# Patient Record
Sex: MALE | Race: WHITE | NOT HISPANIC OR LATINO | Employment: OTHER | URBAN - METROPOLITAN AREA
[De-identification: names, ages, dates, MRNs, and addresses within clinical notes are randomized per-mention and may not be internally consistent; named-entity substitution may affect disease eponyms.]

---

## 2017-08-08 ENCOUNTER — TELEPHONE (OUTPATIENT)
Dept: PRIMARY CARE CLINIC | Facility: CLINIC | Age: 82
End: 2017-08-08

## 2017-08-08 NOTE — TELEPHONE ENCOUNTER
Attempted to speak with patient, called requesting the last ov with Dr. Patel. That office date 07/19/2017 Hospital follow up.

## 2017-08-08 NOTE — TELEPHONE ENCOUNTER
----- Message from Shelby Fischer sent at 8/8/2017  9:19 AM CDT -----  Contact: [facility]  Wants date of last visit with Dr Langston   Call back

## 2017-08-10 ENCOUNTER — TELEPHONE (OUTPATIENT)
Dept: PRIMARY CARE CLINIC | Facility: CLINIC | Age: 82
End: 2017-08-10

## 2017-08-10 NOTE — TELEPHONE ENCOUNTER
----- Message from Tenisha Lombardo sent at 8/10/2017 10:13 AM CDT -----  Contact: Duyen with [facility] phone [phone]  Duyen with [facility] phone [phone], Second time calling needs to find out when was the last time Dr. Langston saw this patient. And also needs to know if Dr. Langston will continue to follow this patient. Please advise. Thanks.

## 2017-08-10 NOTE — TELEPHONE ENCOUNTER
Patient was seen on 7/19/17 for hospital follow up.. [facility] is sending orders over that need to be signed by PCP as hospitalist only sign for one time

## 2017-08-14 RX ORDER — CARVEDILOL 3.12 MG/1
3.12 TABLET ORAL 2 TIMES DAILY WITH MEALS
Qty: 60 TABLET | Refills: 11 | Status: ON HOLD | OUTPATIENT
Start: 2017-08-14 | End: 2018-08-02 | Stop reason: HOSPADM

## 2017-08-15 RX ORDER — LOVASTATIN 20 MG/1
20 TABLET ORAL NIGHTLY
Qty: 90 TABLET | Refills: 3 | Status: CANCELLED | OUTPATIENT
Start: 2017-08-15 | End: 2018-08-15

## 2017-08-15 RX ORDER — LOVASTATIN 20 MG/1
20 TABLET ORAL NIGHTLY
Qty: 90 TABLET | Refills: 3 | Status: SHIPPED | OUTPATIENT
Start: 2017-08-15 | End: 2018-12-21 | Stop reason: SDUPTHER

## 2017-08-31 ENCOUNTER — TELEPHONE (OUTPATIENT)
Dept: PRIMARY CARE CLINIC | Facility: CLINIC | Age: 82
End: 2017-08-31

## 2017-08-31 NOTE — TELEPHONE ENCOUNTER
----- Message from Carisa Pearson sent at 8/31/2017  4:39 PM CDT -----  Contact: [facility]  Call connected to pod. Received Critical lab results and CBC was called into Dr, needs to know if patient was called, in the hospital or if there are other orders.

## 2017-08-31 NOTE — TELEPHONE ENCOUNTER
Advised patient niece was taking patient to ED per Dr Langston.. Called and confirmed patient was currently in ED

## 2017-09-06 RX ORDER — FINASTERIDE 5 MG/1
5 TABLET, FILM COATED ORAL DAILY
Qty: 30 TABLET | Refills: 11 | Status: SHIPPED | OUTPATIENT
Start: 2017-09-06 | End: 2018-09-25 | Stop reason: SDUPTHER

## 2017-09-08 ENCOUNTER — TELEPHONE (OUTPATIENT)
Dept: PRIMARY CARE CLINIC | Facility: CLINIC | Age: 82
End: 2017-09-08

## 2017-09-08 NOTE — TELEPHONE ENCOUNTER
----- Message from Peter May sent at 9/8/2017  9:09 AM CDT -----  Contact: [facility] nurse- Yaquelin [phone]  The nurse want to know if the doctor will continue taking insurance [employer].Thanks!

## 2017-09-14 ENCOUNTER — TELEPHONE (OUTPATIENT)
Dept: PRIMARY CARE CLINIC | Facility: CLINIC | Age: 82
End: 2017-09-14

## 2017-09-14 NOTE — TELEPHONE ENCOUNTER
----- Message from Shelby Fischer sent at 9/14/2017  8:05 AM CDT -----  Contact: antonijosesito flores   Wants labs ordered thru home health  Call back

## 2017-09-15 ENCOUNTER — TELEPHONE (OUTPATIENT)
Dept: PRIMARY CARE CLINIC | Facility: CLINIC | Age: 82
End: 2017-09-15

## 2017-09-15 DIAGNOSIS — D64.9 ANEMIA, UNSPECIFIED TYPE: Primary | ICD-10-CM

## 2017-09-15 RX ORDER — HYDROCHLOROTHIAZIDE 25 MG/1
25 TABLET ORAL DAILY
Qty: 30 TABLET | Refills: 11 | Status: SHIPPED | OUTPATIENT
Start: 2017-09-15 | End: 2018-01-24

## 2017-09-15 NOTE — TELEPHONE ENCOUNTER
----- Message from Tenisha Álvarez sent at 9/15/2017 10:35 AM CDT -----  Pt has home health ... Asking to give orders for labs to his home health nurse / hard to bring him in... Call Candelaria Bonner [phone]

## 2017-10-04 RX ORDER — CLOPIDOGREL BISULFATE 75 MG/1
75 TABLET ORAL DAILY
Qty: 30 TABLET | Refills: 11 | Status: SHIPPED | OUTPATIENT
Start: 2017-10-04 | End: 2017-11-17

## 2017-10-04 RX ORDER — CLOPIDOGREL BISULFATE 75 MG/1
TABLET ORAL
COMMUNITY
Start: 2017-08-16 | End: 2017-10-04 | Stop reason: SDUPTHER

## 2017-11-03 RX ORDER — GABAPENTIN 300 MG/1
300 CAPSULE ORAL 3 TIMES DAILY
Qty: 90 CAPSULE | Refills: 3 | Status: ON HOLD | OUTPATIENT
Start: 2017-11-03 | End: 2018-03-19 | Stop reason: SDUPTHER

## 2017-11-03 RX ORDER — PANTOPRAZOLE SODIUM 40 MG/1
40 TABLET, DELAYED RELEASE ORAL DAILY
Qty: 90 TABLET | Refills: 3 | Status: SHIPPED | OUTPATIENT
Start: 2017-11-03 | End: 2018-11-14 | Stop reason: SDUPTHER

## 2017-11-03 RX ORDER — PANTOPRAZOLE SODIUM 40 MG/1
TABLET, DELAYED RELEASE ORAL
COMMUNITY
Start: 2017-08-15 | End: 2017-11-03 | Stop reason: SDUPTHER

## 2017-11-03 NOTE — TELEPHONE ENCOUNTER
----- Message from Peter May sent at 11/3/2017 10:09 AM CDT -----  Contact: Nevicente Lewis [place] [phone]  Patient needs a refill on rx gabapentin, rx pantoprazole with [facility] Patient had labs drawn by home health nurse, patient's niece called asking for lab results.. Thanks!

## 2017-11-17 ENCOUNTER — OFFICE VISIT (OUTPATIENT)
Dept: HEMATOLOGY/ONCOLOGY | Facility: CLINIC | Age: 82
End: 2017-11-17
Payer: COMMERCIAL

## 2017-11-17 VITALS
SYSTOLIC BLOOD PRESSURE: 146 MMHG | WEIGHT: 155.13 LBS | HEIGHT: 65 IN | RESPIRATION RATE: 18 BRPM | DIASTOLIC BLOOD PRESSURE: 56 MMHG | HEART RATE: 63 BPM | TEMPERATURE: 98 F | BODY MASS INDEX: 25.85 KG/M2

## 2017-11-17 DIAGNOSIS — K92.2 GASTROINTESTINAL HEMORRHAGE, UNSPECIFIED GASTROINTESTINAL HEMORRHAGE TYPE: ICD-10-CM

## 2017-11-17 DIAGNOSIS — R19.5 POSITIVE OCCULT STOOL BLOOD TEST: ICD-10-CM

## 2017-11-17 DIAGNOSIS — D50.0 IRON DEFICIENCY ANEMIA DUE TO CHRONIC BLOOD LOSS: ICD-10-CM

## 2017-11-17 DIAGNOSIS — N18.4 ANEMIA, CHRONIC RENAL FAILURE, STAGE 4 (SEVERE): ICD-10-CM

## 2017-11-17 DIAGNOSIS — D63.8 ANEMIA OF CHRONIC DISORDER: ICD-10-CM

## 2017-11-17 DIAGNOSIS — D63.1 ANEMIA, CHRONIC RENAL FAILURE, STAGE 4 (SEVERE): ICD-10-CM

## 2017-11-17 PROCEDURE — 99214 OFFICE O/P EST MOD 30 MIN: CPT | Mod: ,,, | Performed by: INTERNAL MEDICINE

## 2017-11-17 RX ORDER — AMLODIPINE BESYLATE 2.5 MG/1
TABLET ORAL
COMMUNITY
Start: 2017-11-03 | End: 2018-10-10 | Stop reason: SDUPTHER

## 2017-11-17 RX ORDER — SODIUM CHLORIDE 0.9 % (FLUSH) 0.9 %
10 SYRINGE (ML) INJECTION
Status: CANCELLED | OUTPATIENT
Start: 2017-11-30

## 2017-11-17 RX ORDER — HEPARIN 100 UNIT/ML
500 SYRINGE INTRAVENOUS
Status: CANCELLED | OUTPATIENT
Start: 2017-11-30

## 2017-11-17 RX ORDER — TAMSULOSIN HYDROCHLORIDE 0.4 MG/1
CAPSULE ORAL
COMMUNITY
Start: 2017-10-30 | End: 2018-02-15 | Stop reason: SDUPTHER

## 2017-11-17 RX ORDER — PAROXETINE HYDROCHLORIDE 20 MG/1
TABLET, FILM COATED ORAL
COMMUNITY
Start: 2017-11-09 | End: 2018-01-31 | Stop reason: SDUPTHER

## 2017-11-17 RX ORDER — ISOSORBIDE DINITRATE 30 MG/1
TABLET ORAL
COMMUNITY
Start: 2017-10-30 | End: 2018-02-15 | Stop reason: SDUPTHER

## 2017-11-17 RX ORDER — CARBIDOPA AND LEVODOPA 10; 100 MG/1; MG/1
1 TABLET ORAL 2 TIMES DAILY
COMMUNITY
Start: 2017-10-26 | End: 2018-02-28 | Stop reason: SDUPTHER

## 2017-11-17 RX ORDER — METOPROLOL SUCCINATE 25 MG/1
TABLET, EXTENDED RELEASE ORAL
COMMUNITY
Start: 2017-08-31 | End: 2017-12-19

## 2017-11-17 NOTE — LETTER
November 17, 2017      Rickey Langston MD  [address] [place] [address]           Formerly [facility], [facility] Hematology Oncology  [address]  [address]  [facility] [address]  Phone: [phone]  Fax: [phone]          Patient: Parker Carrasco   MR Number: [id number]   YOB: 1925   Date of Visit: 11/17/2017       Dear Dr. Rickey Langston:    Thank you for referring Parker Carrasco to me for evaluation. Attached you will find relevant portions of my assessment and plan of care.    If you have questions, please do not hesitate to call me. I look forward to following Parker Carrasco along with you.    Sincerely,    Jozef Salcido MD    Enclosure  CC:  No Recipients    If you would like to receive this communication electronically, please contact [email or web address] or [phone] to request more information on [employer] access.    For providers and/or their staff who would like to refer a patient to [facility], please contact us through our one-stop-shop provider referral line, [facility] [place], at [phone].    If you feel you have received this communication in error or would no longer like to receive these types of communications, please e-mail [email or web address]

## 2017-11-17 NOTE — PROGRESS NOTES
[facility] Hematology/Oncology  Hospital Follow-up Note    Subjective:      Patient ID:   NAME: Parker Carrasco : 1925     89 y.o. male    Referring Doc: Rickey Langston  Other Physicians: Codey Verdugo/Roxanne (FRANCHESKA);     Chief Complaint: anemia f/u      HPI:  89 y.o. male with diagnosis of anemia from GIB  who was seen as a consult at [facility] by my associate Dr CHARLY Morrison. He is followed by Dr Ramirez and he is having a capsule endoscopy on Dec 6th at [facility]. He decided not to have the heart valve surgery. He is here with his daughter. His goal is quality of life at this point. He is breathing ok but has occasional YORK. He denies any CP, SOB, HA' sor N/V.             ROS:   GEN: normal without any fever, night sweats or weight loss  HEENT: normal with no HA's, sore throat, stiff neck, changes in vision  CV: normal with no CP, SOB, PND, YORK or orthopnea  PULM: normal with no SOB, cough, hemoptysis, sputum or pleuritic pain  GI: normal with no abdominal pain, nausea, vomiting, constipation, diarrhea, melanotic stools, BRBPR, or hematemesis  : normal with no hematuria, dysuria  BREAST: normal with no mass, discharge, pain  SKIN: normal with no rash, erythema, bruising, or swelling     Past Medical/Surgical History:  Past Medical History:   Diagnosis Date    Anemia     Arthritis     Heart murmur     Hypertension     Prostate cancer     Ulcer      Past Surgical History:   Procedure Laterality Date    COLECTOMY      partial    COLONOSCOPY      CORONARY ARTERY BYPASS GRAFT      HERNIA REPAIR           Allergies:  Review of patient's allergies indicates:  Allergies not on file    Social/Family History:  Social History     Social History    Marital status:      Spouse name: N/A    Number of children: N/A    Years of education: N/A     Occupational History    Not on file.     Social History Main Topics    Smoking status: Former Smoker     Quit date: 1967    Smokeless tobacco: Never Used     "Alcohol use No    Drug use: No    Sexual activity: Not on file     Other Topics Concern    Not on file     Social History Narrative    No narrative on file     Family History   Problem Relation Age of Onset    Hypertension Mother     Colon cancer Father          Medications:    Current Outpatient Prescriptions:     amLODIPine (NORVASC) 2.5 MG tablet, , Disp: , Rfl:     carbidopa-levodopa  mg (SINEMET)  mg per tablet, , Disp: , Rfl:     carvedilol (COREG) 3.125 MG tablet, Take 1 tablet (3.125 mg total) by mouth 2 (two) times daily with meals., Disp: 60 tablet, Rfl: 11    finasteride (PROSCAR) 5 mg tablet, Take 1 tablet (5 mg total) by mouth once daily., Disp: 30 tablet, Rfl: 11    gabapentin (NEURONTIN) 300 MG capsule, Take 1 capsule (300 mg total) by mouth 3 (three) times daily., Disp: 90 capsule, Rfl: 3    hydrochlorothiazide (HYDRODIURIL) 25 MG tablet, Take 1 tablet (25 mg total) by mouth once daily., Disp: 30 tablet, Rfl: 11    isosorbide dinitrate (ISORDIL) 30 MG Tab, , Disp: , Rfl:     lovastatin (MEVACOR) 20 MG tablet, Take 1 tablet (20 mg total) by mouth every evening., Disp: 90 tablet, Rfl: 3    metoprolol succinate (TOPROL-XL) 25 MG 24 hr tablet, , Disp: , Rfl:     pantoprazole (PROTONIX) 40 MG tablet, Take 1 tablet (40 mg total) by mouth once daily., Disp: 90 tablet, Rfl: 3    paroxetine (PAXIL) 20 MG tablet, , Disp: , Rfl:     tamsulosin (FLOMAX) 0.4 mg Cp24, , Disp: , Rfl:       Pathology:  No matching staging information was found for the patient.        Objective:   Vitals:  Blood pressure (!) 146/56, pulse 63, temperature 97.5 °F (36.4 °C), resp. rate 18, height 5' 5" (1.651 m), weight 70.4 kg (155 lb 1.6 oz).    Physical Examination:   GEN: no apparent distress, comfortable; AAOx3  HEAD: atraumatic and normocephalic  EYES: no pallor, no icterus, PERRLA  ENT: OMM, no pharyngeal erythema, external ears WNL; no nasal discharge; no thrush; left eye enucletaed  NECK: no " masses, thyroid normal, trachea midline, no LAD/LN's, supple  CV: RRR with no murmur; normal pulse; normal S1 and S2; no pedal edema  CHEST: Normal respiratory effort; CTAB; normal breath sounds; no wheeze or crackles  ABDOM: nontender and nondistended; soft; normal bowel sounds; no rebound/guarding  MUSC/Skeletal: right knee problema and arthropathy; uses cane  EXTREM: no clubbing, cyanosis, inflammation or swelling  SKIN: no rashes, lesions, ulcers, petechiae or subcutaneous nodules  : no brand  NEURO: grossly intact; motor/sensory WNL; AAOx3; no tremors  PSYCH: normal mood, affect and behavior  LYMPH: normal cervical, supraclavicular, axillary and groin LN's      Labs:   Lab Results   Component Value Date    WBC 6.00 11/09/2017    HGB 10.5 (L) 11/09/2017    HCT 32.2 (L) 11/09/2017    MCV 85 11/09/2017     11/09/2017    CMP  Sodium   Date Value Ref Range Status   11/09/2017 134 (L) 136 - 144 mmol/L Final     Potassium   Date Value Ref Range Status   11/09/2017 4.3 3.6 - 5.1 mmol/L Final     Chloride   Date Value Ref Range Status   11/09/2017 99 (L) 101 - 111 mmol/L Final     CO2   Date Value Ref Range Status   11/09/2017 34 (H) 21 - 27 mmol/L Final     Glucose   Date Value Ref Range Status   11/09/2017 70 (L) 74 - 118 mg/dL Final     BUN, Bld   Date Value Ref Range Status   11/09/2017 43 (H) 8 - 26 mg/dL Final     Creatinine   Date Value Ref Range Status   11/09/2017 1.9 (H) 0.6 - 1.2 mg/dL Final     Calcium   Date Value Ref Range Status   11/09/2017 8.9 8.6 - 10.0 mg/dL Final     Anion Gap   Date Value Ref Range Status   11/09/2017 1 (L) 8 - 16 mmol/L Final     eGFR if    Date Value Ref Range Status   11/09/2017 34.6 (A) >60 mL/min/1.73 m^2 Final     eGFR if non    Date Value Ref Range Status   11/09/2017 29.9 (A) >60 mL/min/1.73 m^2 Final     Comment:     Calculation used to obtain the estimated glomerular filtration  rate (eGFR) is the CKD-EPI equation.          I have  reviewed all available lab results and radiology reports.    Radiology/Diagnostic Studies:    CXR 10/7/2017 negative      All lab results and imaging results have been reviewed and discussed with the patient    Assessment:   (1) 89 y.o. male with diagnosis of transfusion dependant anemia who was seen as a consult at [facility] in Oct 2017 by my associate Dr CHARLY Morrison  - [facility] parameters  - multifactorial etiology with chronic GIB and anemia of chronic renal disease  - iron deficiency from the chronic GIB with OBS positive on multiple tests  - ferritin was low at 12; epo was 10.2; haptoglobin was 87  - he is followed by Dr Ramirez with GI and had colonoscopy in July 2017  - total bili was wnl so I do not suspect any underlying hemolysis  - repeat hgb on 11/9 was 10.5    (2) CAD s/p CABG in past; CHF and paroxysmal atrial fib - followed by Dr Alegre in [facility] and Dr Oliveira in [facility]    (3) Aortic valve stenosis    (4) BPH with elevated PSA    (5) Hx/of right partial colectomy and stomach ulcers (PUD)    (6) HTN and macular degeneration    (7) Chronic COPD/emphysema and pulmonary HTN; former smoker    (8) CRI - he does not have a kidney specialist          Plan:     PLAN:  1. Set up with weekly IV iron for 4 weeks  2. F/u with PCP, Card, GI etc  3. Check labs again after  4 weeks of therapy  4. RTC in  1 month  Fax note to Rickey Langston MD, Codey Ramirez/Roxanne            There are no diagnoses linked to this encounter.  No Follow-up on file.        I have explained and the patient understands all of  the current recommendation(s). I have answered all of their questions to the best of my ability and to their complete satisfaction.             Thank you for allowing me to participate in this pleasant patient's care. Please call with any questions or concerns.      Electronically signed Jozef Salcido MD

## 2017-11-29 RX ORDER — HEPARIN 100 UNIT/ML
500 SYRINGE INTRAVENOUS
Status: CANCELLED | OUTPATIENT
Start: 2017-11-30

## 2017-11-29 RX ORDER — SODIUM CHLORIDE 0.9 % (FLUSH) 0.9 %
10 SYRINGE (ML) INJECTION
Status: CANCELLED | OUTPATIENT
Start: 2017-11-30

## 2017-12-13 ENCOUNTER — TELEPHONE (OUTPATIENT)
Dept: HEMATOLOGY/ONCOLOGY | Facility: CLINIC | Age: 82
End: 2017-12-13

## 2017-12-14 ENCOUNTER — OFFICE VISIT (OUTPATIENT)
Dept: HEMATOLOGY/ONCOLOGY | Facility: CLINIC | Age: 82
End: 2017-12-14
Payer: COMMERCIAL

## 2017-12-14 VITALS
DIASTOLIC BLOOD PRESSURE: 39 MMHG | SYSTOLIC BLOOD PRESSURE: 92 MMHG | BODY MASS INDEX: 27.57 KG/M2 | RESPIRATION RATE: 18 BRPM | TEMPERATURE: 98 F | HEIGHT: 63 IN | HEART RATE: 59 BPM | WEIGHT: 155.63 LBS

## 2017-12-14 DIAGNOSIS — D63.8 ANEMIA OF CHRONIC DISORDER: ICD-10-CM

## 2017-12-14 DIAGNOSIS — R19.5 POSITIVE OCCULT STOOL BLOOD TEST: ICD-10-CM

## 2017-12-14 DIAGNOSIS — K92.2 GASTROINTESTINAL HEMORRHAGE, UNSPECIFIED GASTROINTESTINAL HEMORRHAGE TYPE: ICD-10-CM

## 2017-12-14 DIAGNOSIS — N18.4 ANEMIA, CHRONIC RENAL FAILURE, STAGE 4 (SEVERE): ICD-10-CM

## 2017-12-14 DIAGNOSIS — D50.0 IRON DEFICIENCY ANEMIA DUE TO CHRONIC BLOOD LOSS: Primary | ICD-10-CM

## 2017-12-14 DIAGNOSIS — D63.1 ANEMIA, CHRONIC RENAL FAILURE, STAGE 4 (SEVERE): ICD-10-CM

## 2017-12-14 PROCEDURE — 99213 OFFICE O/P EST LOW 20 MIN: CPT | Mod: ,,, | Performed by: INTERNAL MEDICINE

## 2017-12-14 NOTE — PROGRESS NOTES
[facility] Hematology/Oncology  PROGRESS NOTE      Subjective:       Patient ID:   NAME: Parker Carrasco : 1925     89 y.o. male    Referring Doc: Rickey Langston MD  Other Physicians: Codey Ramirez    Chief Complaint:  Anemia f/u    History of Present Illness:     Patient returns today for a regularly scheduled follow-up visit.  The patient is here with his wife and niece. He is about to finish with the capsule endoscopy with KUB planned for today. He follows up with Dr Ramirez on dec 28th. He has had one infusion of the IV iron  Already and #2 is today. He is tolerating the IV iron well. Breathing ok; energy level ok. No CP, N/V, or HA's            ROS:   GEN: normal without any fever, night sweats or weight loss  HEENT: normal with no HA's, sore throat, stiff neck, changes in vision  CV: normal with no CP, SOB, PND, YORK or orthopnea  PULM: normal with no SOB, cough, hemoptysis, sputum or pleuritic pain  GI: normal with no abdominal pain, nausea, vomiting, constipation, diarrhea, melanotic stools, BRBPR, or hematemesis  : normal with no hematuria, dysuria  BREAST: normal with no mass, discharge, pain  SKIN: normal with no rash, erythema, bruising, or swelling    Allergies:  Review of patient's allergies indicates:  No Known Allergies    Medications:    Current Outpatient Prescriptions:     amLODIPine (NORVASC) 2.5 MG tablet, , Disp: , Rfl:     carbidopa-levodopa  mg (SINEMET)  mg per tablet, , Disp: , Rfl:     carvedilol (COREG) 3.125 MG tablet, Take 1 tablet (3.125 mg total) by mouth 2 (two) times daily with meals., Disp: 60 tablet, Rfl: 11    finasteride (PROSCAR) 5 mg tablet, Take 1 tablet (5 mg total) by mouth once daily., Disp: 30 tablet, Rfl: 11    gabapentin (NEURONTIN) 300 MG capsule, Take 1 capsule (300 mg total) by mouth 3 (three) times daily., Disp: 90 capsule, Rfl: 3    hydrochlorothiazide (HYDRODIURIL) 25 MG tablet, Take 1 tablet (25 mg total) by mouth once daily., Disp: 30 tablet,  "Rfl: 11    isosorbide dinitrate (ISORDIL) 30 MG Tab, , Disp: , Rfl:     lovastatin (MEVACOR) 20 MG tablet, Take 1 tablet (20 mg total) by mouth every evening., Disp: 90 tablet, Rfl: 3    metoprolol succinate (TOPROL-XL) 25 MG 24 hr tablet, , Disp: , Rfl:     pantoprazole (PROTONIX) 40 MG tablet, Take 1 tablet (40 mg total) by mouth once daily., Disp: 90 tablet, Rfl: 3    paroxetine (PAXIL) 20 MG tablet, , Disp: , Rfl:     tamsulosin (FLOMAX) 0.4 mg Cp24, , Disp: , Rfl:     PMHx/PSHx Updates:  See patient's last visit with me on 11/17/2017.  See H&P on 11/17/2017        Pathology:  No matching staging information was found for the patient.          Objective:     Vitals:  Blood pressure (!) 92/39, pulse (!) 59, temperature 97.6 °F (36.4 °C), resp. rate 18, height 5' 3" (1.6 m), weight 70.6 kg (155 lb 9.6 oz).    Physical Examination:   GEN: no apparent distress, comfortable; AAOx3  HEAD: atraumatic and normocephalic  EYES: no pallor, no icterus, PERRLA  ENT: OMM, no pharyngeal erythema, external ears WNL; no nasal discharge; no thrush  NECK: no masses, thyroid normal, trachea midline, no LAD/LN's, supple  CV: RRR with no murmur; normal pulse; normal S1 and S2; no pedal edema  CHEST: Normal respiratory effort; CTAB; normal breath sounds; no wheeze or crackles  ABDOM: nontender and nondistended; soft; normal bowel sounds; no rebound/guarding  MUSC/Skeletal: ROM normal; no crepitus; joints normal; no deformities or arthropathy  EXTREM: no clubbing, cyanosis, inflammation or swelling  SKIN: no rashes, lesions, ulcers, petechiae or subcutaneous nodules  : no brand  NEURO: grossly intact; motor/sensory WNL; AAOx3; no tremors  PSYCH: normal mood, affect and behavior  LYMPH: normal cervical, supraclavicular, axillary and groin LN's            Labs:   12/11/2017  Lab Results   Component Value Date    IRON 27 (L) 12/11/2017    TIBC 425 12/11/2017    FERRITIN 41 12/11/2017     Lab Results   Component Value Date    WBC " 7.30 12/11/2017    HGB 8.0 (L) 12/11/2017    HCT 24.4 (L) 12/11/2017    MCV 88 12/11/2017     12/11/2017     BMP  Lab Results   Component Value Date     (L) 12/11/2017    K 4.1 12/11/2017    CL 99 (L) 12/11/2017    CO2 28 (H) 12/11/2017    BUN 47 (H) 12/11/2017    CREATININE 1.9 (H) 12/11/2017    CALCIUM 8.6 12/11/2017    ANIONGAP 7 (L) 12/11/2017    ESTGFRAFRICA 34.6 (A) 12/11/2017    EGFRNONAA 29.9 (A) 12/11/2017     Lab Results   Component Value Date    ALT 6 (L) 12/11/2017    AST 21 12/11/2017    ALKPHOS 115 12/11/2017    BILITOT 0.3 12/11/2017             Radiology/Diagnostic Studies:    No results found.    I have reviewed all available lab results and radiology reports.    Assessment/Plan:   (1) 89 y.o. male with diagnosis of transfusion dependant anemia who was seen as a consult at [facility] in Oct 2017 by my associate Dr CHARLY Morrison  - [facility] parameters  - multifactorial etiology with chronic GIB and anemia of chronic renal disease  - iron deficiency from the chronic GIB with OBS positive on multiple tests  - ferritin is up to 41 and better; epo was 10.2; haptoglobin was 87  - he is followed by Dr Ramirez with GI and had colonoscopy in July 2017  - total bili was wnl so I do not suspect any underlying hemolysis  - repeat hgb on 12/11 hgb was back down 8.0 but he is asymptomatic     (2) CAD s/p CABG in past; CHF and paroxysmal atrial fib - followed by Dr Alegre in [facility] and Dr Oliveira in [facility]     (3) Aortic valve stenosis     (4) BPH with elevated PSA     (5) Hx/of right partial colectomy and stomach ulcers (PUD)     (6) HTN and macular degeneration     (7) Chronic COPD/emphysema and pulmonary HTN; former smoker     (8) CRI - he does not have a kidney specialist         PLAN:  1. Await results of capsule endoscopy  2. continue IV iron weekly through [facility] 1st week  3.check labs weekly with Hgb/HCT and CBC/iron panel monthly  4. F/u with GI and PCP  5. If hgb drops any lower, then will  transfuse  RTC in 4 weeks  Fax note to Rickey Langston MD, kim Ramirez    Discussion:     I have explained all of the above in detail and the patient understands all of the current recommendation(s). I have answered all of their questions to the best of my ability and to their complete satisfaction.   The patient is to continue with the current management plan.            Electronically signed by Jozef Salcido MD

## 2017-12-14 NOTE — LETTER
December 14, 2017      Rcikey Langston MD  [address] [place] [address]           [facility] Hematology Oncology  [address]  [address]  [facility] [place] [address]  Phone: [phone]  Fax: [phone]          Patient: Parker Carrasco   MR Number: [id number]   YOB: 1925   Date of Visit: 12/14/2017       Dear Dr. Rickey Langston:    Thank you for referring Parker Carrasco to me for evaluation. Attached you will find relevant portions of my assessment and plan of care.    If you have questions, please do not hesitate to call me. I look forward to following Parker Carrasco along with you.    Sincerely,    Jozef Salcido MD    Enclosure  CC:  No Recipients    If you would like to receive this communication electronically, please contact [email or web address] or [phone] to request more information on [employer] access.    For providers and/or their staff who would like to refer a patient to [facility], please contact us through our one-stop-shop provider referral line, [facility], at [phone].    If you feel you have received this communication in error or would no longer like to receive these types of communications, please e-mail [email or web address]

## 2017-12-19 PROBLEM — R07.9 CHEST PAIN: Status: ACTIVE | Noted: 2017-12-19

## 2017-12-20 PROBLEM — H90.6 MIXED CONDUCTIVE AND SENSORINEURAL HEARING LOSS OF BOTH EARS: Status: ACTIVE | Noted: 2017-12-20

## 2017-12-20 PROBLEM — N18.30 CHRONIC RENAL IMPAIRMENT, STAGE 3 (MODERATE): Status: ACTIVE | Noted: 2017-12-20

## 2017-12-21 PROBLEM — D64.9 ANEMIA: Status: ACTIVE | Noted: 2017-11-17

## 2017-12-22 PROBLEM — R07.9 CHEST PAIN: Status: RESOLVED | Noted: 2017-12-19 | Resolved: 2017-12-22

## 2018-01-04 ENCOUNTER — TELEPHONE (OUTPATIENT)
Dept: NEUROLOGY | Facility: HOSPITAL | Age: 83
End: 2018-01-04

## 2018-01-04 NOTE — TELEPHONE ENCOUNTER
Message placed on voicemail to contact this caller.  Attempt to schedule clinic appt, pt must bring video capsule cd.

## 2018-01-04 NOTE — TELEPHONE ENCOUNTER
Clinic appt scheduled with kylie Gaona, on Wednesday, January 24, 2018, at 145pm.  Candelaria given clinic address and telephone number.  Candelaria repeated information correctly.

## 2018-01-04 NOTE — TELEPHONE ENCOUNTER
----- Message from Socorro Carri sent at 12/29/2017 11:24 AM CST -----  Contact: Patient's niece  GI:  Candelaria, niece of patient, called to schedule new patient appointment.  Mr. Carrasco is being referred by Dr. 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  He has had a capsule endoscopy which confirmed diagnosis.  Candelaria can be reached at [phone].  Referral was sent 12/28 and should be scanned into system soon.  Thank you  abd

## 2018-01-10 ENCOUNTER — TELEPHONE (OUTPATIENT)
Dept: NEUROLOGY | Facility: HOSPITAL | Age: 83
End: 2018-01-10

## 2018-01-10 NOTE — TELEPHONE ENCOUNTER
Call made to Dr. Boyce's office requesting cd of video capsule. Message left with , nurse to call back.

## 2018-01-11 ENCOUNTER — TELEPHONE (OUTPATIENT)
Dept: NEUROLOGY | Facility: HOSPITAL | Age: 83
End: 2018-01-11

## 2018-01-18 PROBLEM — I25.10 CORONARY ARTERY DISEASE: Status: ACTIVE | Noted: 2018-01-18

## 2018-01-22 ENCOUNTER — DOCUMENTATION ONLY (OUTPATIENT)
Dept: NEUROLOGY | Facility: HOSPITAL | Age: 83
End: 2018-01-22

## 2018-01-22 NOTE — PROGRESS NOTES
Video capsule study reviewed. The capsule does not start recording until in the small bowel. 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 The entire study is difficult to interpret due to a large amount of residue in the bowel.   Recommend:   Unless this capsule was placed with an endoscope, there is a bleeding source (likely angioectasia) in the proximal small bowel. Therefore, an upper SBE or DBE exam to evaluate/treat for angioectasias may be next step   Patient of Dr. Ramirez

## 2018-01-24 ENCOUNTER — OFFICE VISIT (OUTPATIENT)
Dept: NEUROLOGY | Facility: HOSPITAL | Age: 83
End: 2018-01-24
Attending: INTERNAL MEDICINE
Payer: MEDICARE

## 2018-01-24 VITALS
WEIGHT: 156.19 LBS | DIASTOLIC BLOOD PRESSURE: 60 MMHG | HEIGHT: 64 IN | TEMPERATURE: 97 F | BODY MASS INDEX: 26.67 KG/M2 | HEART RATE: 59 BPM | SYSTOLIC BLOOD PRESSURE: 133 MMHG

## 2018-01-24 DIAGNOSIS — D50.0 ANEMIA DUE TO CHRONIC BLOOD LOSS: Primary | ICD-10-CM

## 2018-01-24 PROCEDURE — 99214 OFFICE O/P EST MOD 30 MIN: CPT | Performed by: INTERNAL MEDICINE

## 2018-01-24 NOTE — PROGRESS NOTES
[facility]    CC: anemia    HPI 89 y.o. male with history of CHF, HTN, HLD aortic and carotid stenosis, GERARDO, and prostate cancer, colon polyps requiring partial colectomy, who present with two years of symptomatic anemia requiring multiple blood and iron transfusions in the past associated with exertional dyspnea, pallor and weakness. Has had 5 hospital admissions this past year, usually requiring 2-3 units pRBCs. Last admission was 1/17/18-1/19/18 at [facility].  Had EGD and colonoscopy that was unrevealing for source.  Has subsequent VCE that showed red blood in the first portion of the small bowel.  Patients states he has frequent black and dark stools, but never bright red blood in stools.  Denies NSAID use.  Not currently taking daily PO iron, but is on Protonix 40mg BID.     Collateral history was provided by niece.  Imaging and records reviewed.     Past Medical History:   Diagnosis Date    Anemia     Anemia of chronic disorder 11/17/2017    Anemia, chronic renal failure, stage 4 (severe) 11/17/2017    Arthritis     CHF (congestive heart failure)     Coronary artery disease     Encounter for blood transfusion     GI bleeding 11/17/2017    Heart murmur     High cholesterol     Hypertension     Iron deficiency anemia due to chronic blood loss 11/17/2017    Positive occult stool blood test 11/17/2017    Prostate cancer     Prostate CA    Ulcer          Past Surgical History:   Procedure Laterality Date    CARDIAC SURGERY      COLECTOMY      partial    COLONOSCOPY      CORONARY ARTERY BYPASS GRAFT      CORONARY ARTERY BYPASS GRAFT      ENUCLEATION      EYE SURGERY      HERNIA REPAIR         Social History  Social History   Substance Use Topics    Smoking status: Former Smoker     Quit date: 11/17/1967    Smokeless tobacco: Never Used    Alcohol use No         Family History   Problem Relation Age of Onset    Hypertension Mother     Colon cancer Father      Social  "History: Former smoker, former drinker, no illicit drug use.     Review of Systems  General ROS: negative for chills, fever or weight loss  Psychological ROS: negative for hallucination, depression or suicidal ideation  Ophthalmic ROS: negative for blurry vision, photophobia or eye pain  ENT ROS: negative for epistaxis, sore throat or rhinorrhea  Respiratory ROS: no cough, shortness of breath, or wheezing  Cardiovascular ROS: no chest pain, +dyspnea on exertion  Gastrointestinal ROS: no abdominal pain, change in bowel habits, +black/ bloody stools  Genito-Urinary ROS: no dysuria, trouble voiding, or hematuria  Musculoskeletal ROS: negative for gait disturbance or muscular weakness  Neurological ROS: no syncope or seizures; no ataxia  Dermatological ROS: negative for pruritis, rash and jaundice    Physical Examination  /60   Pulse (!) 59   Temp 97 °F (36.1 °C) (Oral)   Ht 5' 4" (1.626 m)   Wt 70.8 kg (156 lb 3.1 oz)   BMI 26.81 kg/m²   General appearance: minimally talkative, pale, elderly gentleman, alert, cooperative, no distress  HENT: Normocephalic, atraumatic, neck symmetrical, no nasal discharge,   Eyes: missing one eye, conjunctivae/cornea clear, PERRL, EOM's intact  Lungs: clear to auscultation bilaterally, no dullness to percussion bilaterally  Heart: Harsh systolic murmur regular rate and rhythm without rub; no displacement of the PMI   Abdomen: soft, non-tender; bowel sounds normoactive; no organomegaly  Extremities: extremities symmetric; no clubbing, cyanosis, or edema  Integument: Skin color, texture, turgor normal; no rashes; hair distrubution normal  Neurologic: Alert and oriented X 3, normal strength, normal coordination and gait  Psychiatric: no pressured speech; normal affect; no evidence of impaired cognition     Labs:  Lab Results   Component Value Date    WBC 8.50 01/19/2018    HGB 11.0 (L) 01/19/2018    HCT 33.5 (L) 01/19/2018    MCV 86 01/19/2018     01/19/2018     BMP  Lab " Results   Component Value Date     (L) 01/19/2018    K 3.9 01/19/2018    CL 99 (L) 01/19/2018    CO2 27 01/19/2018    BUN 49 (H) 01/19/2018    CREATININE 1.8 (H) 01/19/2018    CALCIUM 9.1 01/19/2018    ANIONGAP 7 (L) 01/19/2018    ESTGFRAFRICA 36.9 (A) 01/19/2018    EGFRNONAA 32.0 (A) 01/19/2018     Lab Results   Component Value Date    ALT 12 (L) 01/19/2018    AST 22 01/19/2018    ALKPHOS 194 (H) 01/19/2018    BILITOT 0.7 01/19/2018     Lab Results   Component Value Date    IRON 49 01/19/2018    TIBC 440 01/19/2018    FERRITIN 41 12/11/2017   transferrin 297    Imaging/Procedures:    VCE 1/22/18:   The capsule does not start recording until in the small bowel. 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 The entire study is difficult to interpret due to a large amount of residue in the bowel. Recommend:   Unless this capsule was placed with an endoscope, there is a bleeding source (likely angioectasia) in the proximal small bowel. Therefore, an upper SBE or DBE exam to evaluate/treat for angioectasias may be next step     Assessment:     89M with chronic symptomatic anemia requiring multiple hospitalizations in last two years for blood transfusions and IV iron in the past, with possible bleeding source (likely angioectasia) in the proximal small bowel .    Plan:   -DBE on 2/15/18  -daily PO iron therapy to start after procedure  - Patient of Dr. Oumar Bill MD   [address], [address]   [place] [address] [phone]

## 2018-01-24 NOTE — PATIENT INSTRUCTIONS
You are scheduled for an Upper SBE on ______Thursday, February 15, 2018___________________________    You should eat light meals the day before the procedure and nothing to eat or drink after midnight the night before your procedure.    You will need to be at the 1st floor admission desk at the hospital on ____Endoscopy will contact with arrival time____________________

## 2018-01-31 RX ORDER — PAROXETINE HYDROCHLORIDE 20 MG/1
20 TABLET, FILM COATED ORAL DAILY
Qty: 30 TABLET | Refills: 5 | Status: SHIPPED | OUTPATIENT
Start: 2018-01-31 | End: 2018-08-15 | Stop reason: SDUPTHER

## 2018-01-31 NOTE — TELEPHONE ENCOUNTER
----- Message from Anne Wong sent at 1/31/2018  1:01 PM CST -----  Patient requested refill on paroxetine (PAXIL) 20 MG tablet, call into [facility] at  [phone]. He states pharmacy has been trying to reach office for 3 days. Please call patient at  [phone] if you have any questions. Thank you.         [facility] - [facility] [address]  [address] [address]  Phone: [phone] Fax: [phone]

## 2018-02-14 ENCOUNTER — ANESTHESIA EVENT (OUTPATIENT)
Dept: ENDOSCOPY | Facility: HOSPITAL | Age: 83
End: 2018-02-14
Payer: MEDICARE

## 2018-02-15 ENCOUNTER — HOSPITAL ENCOUNTER (OUTPATIENT)
Facility: HOSPITAL | Age: 83
Discharge: HOME | End: 2018-02-15
Attending: INTERNAL MEDICINE | Admitting: INTERNAL MEDICINE
Payer: MEDICARE

## 2018-02-15 ENCOUNTER — ANESTHESIA (OUTPATIENT)
Dept: ENDOSCOPY | Facility: HOSPITAL | Age: 83
End: 2018-02-15
Payer: MEDICARE

## 2018-02-15 VITALS
WEIGHT: 140 LBS | DIASTOLIC BLOOD PRESSURE: 53 MMHG | OXYGEN SATURATION: 97 % | RESPIRATION RATE: 16 BRPM | HEIGHT: 64 IN | HEART RATE: 63 BPM | BODY MASS INDEX: 23.9 KG/M2 | SYSTOLIC BLOOD PRESSURE: 154 MMHG | TEMPERATURE: 98 F

## 2018-02-15 DIAGNOSIS — I35.0 AORTIC STENOSIS: ICD-10-CM

## 2018-02-15 DIAGNOSIS — D50.0 IRON DEFICIENCY ANEMIA DUE TO CHRONIC BLOOD LOSS: Primary | ICD-10-CM

## 2018-02-15 DIAGNOSIS — D64.9 ANEMIA: ICD-10-CM

## 2018-02-15 LAB
AORTIC VALVE REGURGITATION: NORMAL
ESTIMATED PA SYSTOLIC PRESSURE: 39.72
MITRAL VALVE REGURGITATION: NORMAL
RETIRED EF AND QEF - SEE NOTES: 50 (ref 55–65)
TRICUSPID VALVE REGURGITATION: NORMAL

## 2018-02-15 PROCEDURE — 37000009 HC ANESTHESIA EA ADD 15 MINS: Performed by: INTERNAL MEDICINE

## 2018-02-15 PROCEDURE — 27201238 HC BALLOON, OVERTUBE (ANY): Performed by: INTERNAL MEDICINE

## 2018-02-15 PROCEDURE — 93010 ELECTROCARDIOGRAM REPORT: CPT | Mod: ,,, | Performed by: INTERNAL MEDICINE

## 2018-02-15 PROCEDURE — 44378 SMALL BOWEL ENDOSCOPY: CPT | Performed by: INTERNAL MEDICINE

## 2018-02-15 PROCEDURE — 44799 UNLISTED PX SMALL INTESTINE: CPT | Performed by: INTERNAL MEDICINE

## 2018-02-15 PROCEDURE — 25000003 PHARM REV CODE 250: Performed by: INTERNAL MEDICINE

## 2018-02-15 PROCEDURE — 93005 ELECTROCARDIOGRAM TRACING: CPT | Mod: 59

## 2018-02-15 PROCEDURE — 93306 TTE W/DOPPLER COMPLETE: CPT

## 2018-02-15 PROCEDURE — 27201028 HC NEEDLE, SCLERO: Performed by: INTERNAL MEDICINE

## 2018-02-15 PROCEDURE — 37000008 HC ANESTHESIA 1ST 15 MINUTES: Performed by: INTERNAL MEDICINE

## 2018-02-15 PROCEDURE — 27202087 HC PROBE, APC: Performed by: INTERNAL MEDICINE

## 2018-02-15 PROCEDURE — 25000003 PHARM REV CODE 250: Performed by: NURSE ANESTHETIST, CERTIFIED REGISTERED

## 2018-02-15 PROCEDURE — 63600175 PHARM REV CODE 636 W HCPCS: Performed by: NURSE ANESTHETIST, CERTIFIED REGISTERED

## 2018-02-15 RX ORDER — LIDOCAINE HCL/PF 100 MG/5ML
SYRINGE (ML) INTRAVENOUS
Status: DISCONTINUED | OUTPATIENT
Start: 2018-02-15 | End: 2018-02-15

## 2018-02-15 RX ORDER — SODIUM CHLORIDE 9 MG/ML
INJECTION, SOLUTION INTRAVENOUS CONTINUOUS
Status: DISCONTINUED | OUTPATIENT
Start: 2018-02-15 | End: 2018-02-15 | Stop reason: HOSPADM

## 2018-02-15 RX ORDER — ONDANSETRON HYDROCHLORIDE 2 MG/ML
INJECTION, SOLUTION INTRAMUSCULAR; INTRAVENOUS
Status: DISCONTINUED | OUTPATIENT
Start: 2018-02-15 | End: 2018-02-15

## 2018-02-15 RX ORDER — SUCCINYLCHOLINE CHLORIDE 20 MG/ML
INJECTION INTRAMUSCULAR; INTRAVENOUS
Status: DISCONTINUED | OUTPATIENT
Start: 2018-02-15 | End: 2018-02-15

## 2018-02-15 RX ORDER — HYDROMORPHONE HYDROCHLORIDE 2 MG/ML
0.2 INJECTION, SOLUTION INTRAMUSCULAR; INTRAVENOUS; SUBCUTANEOUS EVERY 5 MIN PRN
Status: DISCONTINUED | OUTPATIENT
Start: 2018-02-15 | End: 2018-02-15 | Stop reason: HOSPADM

## 2018-02-15 RX ORDER — SODIUM CHLORIDE 0.9 % (FLUSH) 0.9 %
3 SYRINGE (ML) INJECTION
Status: DISCONTINUED | OUTPATIENT
Start: 2018-02-15 | End: 2018-02-15 | Stop reason: HOSPADM

## 2018-02-15 RX ORDER — SODIUM CHLORIDE 0.9 % (FLUSH) 0.9 %
3 SYRINGE (ML) INJECTION EVERY 8 HOURS
Status: DISCONTINUED | OUTPATIENT
Start: 2018-02-15 | End: 2018-02-15 | Stop reason: HOSPADM

## 2018-02-15 RX ORDER — PHENYLEPHRINE HYDROCHLORIDE 10 MG/ML
INJECTION INTRAVENOUS
Status: DISCONTINUED | OUTPATIENT
Start: 2018-02-15 | End: 2018-02-15

## 2018-02-15 RX ORDER — ETOMIDATE 2 MG/ML
INJECTION INTRAVENOUS
Status: DISCONTINUED | OUTPATIENT
Start: 2018-02-15 | End: 2018-02-15

## 2018-02-15 RX ORDER — FENTANYL CITRATE 50 UG/ML
INJECTION, SOLUTION INTRAMUSCULAR; INTRAVENOUS
Status: DISCONTINUED | OUTPATIENT
Start: 2018-02-15 | End: 2018-02-15

## 2018-02-15 RX ADMIN — FENTANYL CITRATE 50 MCG: 50 INJECTION, SOLUTION INTRAMUSCULAR; INTRAVENOUS at 10:02

## 2018-02-15 RX ADMIN — SODIUM CHLORIDE: 0.9 INJECTION, SOLUTION INTRAVENOUS at 09:02

## 2018-02-15 RX ADMIN — ONDANSETRON 4 MG: 2 INJECTION, SOLUTION INTRAMUSCULAR; INTRAVENOUS at 11:02

## 2018-02-15 RX ADMIN — LIDOCAINE HYDROCHLORIDE 100 MG: 20 INJECTION, SOLUTION INTRAVENOUS at 10:02

## 2018-02-15 RX ADMIN — ETOMIDATE 10 MG: 2 INJECTION, SOLUTION INTRAVENOUS at 10:02

## 2018-02-15 RX ADMIN — PHENYLEPHRINE HYDROCHLORIDE 100 MCG: 10 INJECTION INTRAVENOUS at 10:02

## 2018-02-15 RX ADMIN — SUCCINYLCHOLINE CHLORIDE 100 MG: 20 INJECTION, SOLUTION INTRAMUSCULAR; INTRAVENOUS at 10:02

## 2018-02-15 NOTE — TELEPHONE ENCOUNTER
----- Message from Tenisha Lombardo sent at 2/15/2018  2:36 PM CST -----  Contact: kylie Booth [phone], Calling for refill on Rx    isosorbide dinitrate (ISORDIL) 30 MG Tab  tamsulosin (FLOMAX) 0.4 mg Cp24  Nitroglycerin SL    Please advise. Thanks.      [facility] - [facility] [address]  [address] [address]  Phone: [phone] Fax: [phone]

## 2018-02-15 NOTE — DISCHARGE INSTRUCTIONS
Post Small Bowel Enteroscopy (Antegrade) Discharge Instructions:    Parker Carrasco  2/15/2018  Russell Bill MD    1. Diet: Try sips of water first. If tolerated, resume your regular diet or one recommended by your physician.  2. Do not drive a car or operate machinery, make critical decisions, or do activities that require coordination or balance for 24 hours.  3. You may experience a sore throat for 24 to 48 hours. You may use throat lozenges or gargle with warm, salt water to relieve the discomfort.  4. Because air was put into your stomach/bowel during the procedure, you may experience some belching.  5. Go directly to the emergency room if you notice any of the following:                              Chills and/or fever over 101                Persistent vomiting or vomiting with blood/nasal regurgitation                Severe abdominal pain, other than gas cramps                Severe chest pain                Black, tarry stools    If you have any questions or problems, please call your Physician:    Russell Bill MD      If a complication or emergency situation arises and you are unable to reach your Physician - GO TO THE EMERGENCY ROOM.

## 2018-02-15 NOTE — ANESTHESIA PREPROCEDURE EVALUATION
02/14/2018  Parker Carrasco is a 89 y.o., male w chronic blood loss anemia for upper single/double balloon.    PRIOR ANES (in Epic) [id number]  none    ANES-RELATED MED/SURG  Patient Active Problem List   Diagnosis    Iron deficiency anemia due to chronic blood loss    Anemia, chronic renal failure, stage 4 (severe)    Positive occult stool blood test    GI bleeding    Anemia    Chronic renal impairment, stage 3 (moderate)    Mixed conductive and sensorineural hearing loss of both ears    Coronary artery disease     Past Medical History:   Diagnosis Date    Anemia     Anemia of chronic disorder 11/17/2017    Anemia, chronic renal failure, stage 4 (severe) 11/17/2017    Arthritis     CHF (congestive heart failure)     Coronary artery disease     Encounter for blood transfusion     GI bleeding 11/17/2017    Heart murmur     High cholesterol     Hypertension     Iron deficiency anemia due to chronic blood loss 11/17/2017    Positive occult stool blood test 11/17/2017    Prostate cancer     Prostate CA    Ulcer      Past Surgical History:   Procedure Laterality Date    CARDIAC SURGERY      COLECTOMY      partial    COLONOSCOPY      CORONARY ARTERY BYPASS GRAFT      CORONARY ARTERY BYPASS GRAFT      ENUCLEATION      EYE SURGERY      HERNIA REPAIR       ALLERGIES  Review of patient's allergies indicates:  No Known Allergies    ANES-RELATED HOME Rx [id number]  Amlodipine pantoprazole  Carvedilol  isosorbide  lovastatin    Anesthesia Evaluation         Review of Systems  Anesthesia Hx:  History of prior surgery of interest to airway management or planning: Denies Personal Hx of Anesthesia complications.   Social:  Former Smoker, Social Alcohol Use    Hematology/Oncology:         -- Anemia: Hematology Comments: Blood transfusions  Current/Recent Cancer.   EENT/Dental:   Upper & lower dentures    Cardiovascular:   Hypertension CAD  CABG/stent  Aortic stenosis   Pulmonary:  Pulmonary Normal  Denies Sleep Apnea.    Renal/:   Chronic Renal Disease    Hepatic/GI:   Denies Liver Disease.    Musculoskeletal:  Denies Spine Disorders    Neurological:   Denies TIA. Denies CVA. Carotid stenosis   Endocrine:  Endocrine Normal      Wt Readings from Last 1 Encounters:   01/24/18 70.8 kg (156 lb 3.1 oz)     Temp Readings from Last 1 Encounters:   01/24/18 36.1 °C (97 °F) (Oral)     BP Readings from Last 1 Encounters:   01/24/18 133/60     Pulse Readings from Last 1 Encounters:   01/24/18 (!) 59     SpO2 Readings from Last 1 Encounters:   01/19/18 96%       Physical Exam  General:  Well nourished     Eyes/Ears/Nose:  EYES/EARS/NOSE FINDINGS: Normal   Dental:  Dental Findings: Lower Dentures, Upper Dentures, Edentulous        Mental Status:  Mental Status Findings: (bad hearing severely hampers communication)        Lab Results   Component Value Date    WBC 8.50 01/19/2018    HGB 11.0 (L) 01/19/2018    HCT 33.5 (L) 01/19/2018    MCV 86 01/19/2018     01/19/2018       Chemistry        Component Value Date/Time     (L) 01/19/2018 0324    K 3.9 01/19/2018 0324    CL 99 (L) 01/19/2018 0324    CO2 27 01/19/2018 0324    BUN 49 (H) 01/19/2018 0324    CREATININE 1.8 (H) 01/19/2018 0324    GLU 99 01/19/2018 0324        Component Value Date/Time    CALCIUM 9.1 01/19/2018 0324    ALKPHOS 194 (H) 01/19/2018 0324    AST 22 01/19/2018 0324    ALT 12 (L) 01/19/2018 0324    BILITOT 0.7 01/19/2018 0324    ESTGFRAFRICA 36.9 (A) 01/19/2018 0324    EGFRNONAA 32.0 (A) 01/19/2018 0324            Lab Results   Component Value Date    ALBUMIN 3.6 01/19/2018    No results found for: TSH, I7QCJDM, N7RGLJQ, THYROIDAB    CXR 20180117  No cardiopulmonary abnormality identified.    EKG 20180117  Significant artifact precludes accurate assessment of rhythm  Nonspecific ST and T wave abnormality  Abnormal ECG  When compared with ECG of  17-JAN-2018 17:08,  No significant change was found  Confirmed by ERIN    ECHO [id number] (preliminary reading)  Good EF  Moderate AS      Anesthesia Plan  Type of Anesthesia, risks & benefits discussed:  Anesthesia Type:  MAC, general  Patient's Preference:   Intra-op Monitoring Plan:   Intra-op Monitoring Plan Comments:   Post Op Pain Control Plan:   Post Op Pain Control Plan Comments:   Induction:   IV  Beta Blocker:  Patient is on a Beta-Blocker and has not received dose within the past 24 hours due to non-compliance or for other reasons (Patient should receive a perioperative dose or document why it is withheld).     Beta Blocker Comments: Very difficult to communicate with patient except by writing; perhaps no one told him to take heart Rx day of procedure  Informed Consent: Patient representative understands risks and agrees with Anesthesia plan.  Questions answered. Anesthesia consent signed with patient representative.  ASA Score: 4     Day of Surgery Review of History & Physical:        Anesthesia Plan Notes: [id number]   - pt hard of hearing and seems to be uncertain about aspects of his medical history; spoke to niece on phone   - pt lives alond & takes his one medications   - dentrues are in   - aortic stenosis by history & murmur; echo today preliminary reading:  Good EF & only moderate stenosis   - no Rx today, may need beta blocker if eachycardia        Ready For Surgery From Anesthesia Perspective.

## 2018-02-15 NOTE — PLAN OF CARE
Past Medical History:   Diagnosis Date    Anemia     Anemia of chronic disorder 11/17/2017    Anemia, chronic renal failure, stage 4 (severe) 11/17/2017    Arthritis     CHF (congestive heart failure)     Coronary artery disease     Encounter for blood transfusion     GI bleeding 11/17/2017    Heart murmur     High cholesterol     Hypertension     Iron deficiency anemia due to chronic blood loss 11/17/2017    Positive occult stool blood test 11/17/2017    Prostate cancer     Prostate CA    Ulcer      Dr. Bill visited at bedside, discussed findings and recommendations with patient and family member; all questions asked and answered. Verbalized understanding of information give. Handout provided at time of discharge.

## 2018-02-15 NOTE — H&P
CC: anemia     HPI 89 y.o. male with history of CHF, HTN, HLD aortic and carotid stenosis, GERARDO, and prostate cancer, colon polyps requiring partial colectomy, who present with two years of symptomatic anemia requiring multiple blood and iron transfusions in the past associated with exertional dyspnea, pallor and weakness. Has had 5 hospital admissions this past year, usually requiring 2-3 units pRBCs. Last admission was 1/17/18-1/19/18 at [place].  Had EGD and colonoscopy that was unrevealing for source.  Has subsequent VCE that showed red blood in the first portion of the small bowel.  Patients states he has frequent black and dark stools, but never bright red blood in stools.  Denies NSAID use.  Not currently taking daily PO iron, but is on Protonix 40mg BID.      Collateral history was provided by niece.  Imaging and records reviewed.           Past Medical History:   Diagnosis Date    Anemia      Anemia of chronic disorder 11/17/2017    Anemia, chronic renal failure, stage 4 (severe) 11/17/2017    Arthritis      CHF (congestive heart failure)      Coronary artery disease      Encounter for blood transfusion      GI bleeding 11/17/2017    Heart murmur      High cholesterol      Hypertension      Iron deficiency anemia due to chronic blood loss 11/17/2017    Positive occult stool blood test 11/17/2017    Prostate cancer       Prostate CA    Ulcer                    Past Surgical History:   Procedure Laterality Date    CARDIAC SURGERY        COLECTOMY         partial    COLONOSCOPY        CORONARY ARTERY BYPASS GRAFT        CORONARY ARTERY BYPASS GRAFT        ENUCLEATION        EYE SURGERY        HERNIA REPAIR             Social History        Social History   Substance Use Topics    Smoking status: Former Smoker       Quit date: 11/17/1967    Smokeless tobacco: Never Used    Alcohol use No                  Family History   Problem Relation Age of Onset    Hypertension Mother  "     Colon cancer Father        Social History: Former smoker, former drinker, no illicit drug use.      Review of Systems  General ROS: negative for chills, fever or weight loss  Psychological ROS: negative for hallucination, depression or suicidal ideation  Ophthalmic ROS: negative for blurry vision, photophobia or eye pain  ENT ROS: negative for epistaxis, sore throat or rhinorrhea  Respiratory ROS: no cough, shortness of breath, or wheezing  Cardiovascular ROS: no chest pain, +dyspnea on exertion  Gastrointestinal ROS: no abdominal pain, change in bowel habits, +black/ bloody stools  Genito-Urinary ROS: no dysuria, trouble voiding, or hematuria  Musculoskeletal ROS: negative for gait disturbance or muscular weakness  Neurological ROS: no syncope or seizures; no ataxia  Dermatological ROS: negative for pruritis, rash and jaundice     Physical Examination  /60   Pulse (!) 59   Temp 97 °F (36.1 °C) (Oral)   Ht 5' 4" (1.626 m)   Wt 70.8 kg (156 lb 3.1 oz)   BMI 26.81 kg/m²   General appearance: minimally talkative, pale, elderly gentleman, alert, cooperative, no distress  HENT: Normocephalic, atraumatic, neck symmetrical, no nasal discharge,   Eyes: missing one eye, conjunctivae/cornea clear, PERRL, EOM's intact  Lungs: clear to auscultation bilaterally, no dullness to percussion bilaterally  Heart: Harsh systolic murmur regular rate and rhythm without rub; no displacement of the PMI   Abdomen: soft, non-tender; bowel sounds normoactive; no organomegaly  Extremities: extremities symmetric; no clubbing, cyanosis, or edema  Integument: Skin color, texture, turgor normal; no rashes; hair distrubution normal  Neurologic: Alert and oriented X 3, normal strength, normal coordination and gait  Psychiatric: no pressured speech; normal affect; no evidence of impaired cognition      Labs:        Lab Results   Component Value Date     WBC 8.50 01/19/2018     HGB 11.0 (L) 01/19/2018     HCT 33.5 (L) 01/19/2018     " MCV 86 01/19/2018      01/19/2018      BMP        Lab Results   Component Value Date      (L) 01/19/2018     K 3.9 01/19/2018     CL 99 (L) 01/19/2018     CO2 27 01/19/2018     BUN 49 (H) 01/19/2018     CREATININE 1.8 (H) 01/19/2018     CALCIUM 9.1 01/19/2018     ANIONGAP 7 (L) 01/19/2018     ESTGFRAFRICA 36.9 (A) 01/19/2018     EGFRNONAA 32.0 (A) 01/19/2018            Lab Results   Component Value Date     ALT 12 (L) 01/19/2018     AST 22 01/19/2018     ALKPHOS 194 (H) 01/19/2018     BILITOT 0.7 01/19/2018            Lab Results   Component Value Date     IRON 49 01/19/2018     TIBC 440 01/19/2018     FERRITIN 41 12/11/2017   transferrin 297     Imaging/Procedures:     VCE 1/22/18:   The capsule does not start recording until in the small bowel. 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 The entire study is difficult to interpret due to a large amount of residue in the bowel. Recommend:   Unless this capsule was placed with an endoscope, there is a bleeding source (likely angioectasia) in the proximal small bowel. Therefore, an upper SBE or DBE exam to evaluate/treat for angioectasias may be next step      Assessment:      89M with chronic symptomatic anemia requiring multiple hospitalizations in last two years for blood transfusions and IV iron in the past, with possible bleeding source (likely angioectasia) in the proximal small bowel .     Plan:   -DBE on 2/15/18  -daily PO iron therapy to start after procedure  - Patient of Dr. Oumar Ramirez

## 2018-02-15 NOTE — TRANSFER OF CARE
"Anesthesia Transfer of Care Note    Patient: Parker Carrasco    Procedure(s) Performed: Procedure(s) (LRB):  Upper single or double with MAC or general (N/A)    Patient location: PACU    Anesthesia Type: general    Transport from OR: Transported from OR on 100% O2 by closed face mask with adequate spontaneous ventilation    Post pain: adequate analgesia    Post assessment: no apparent anesthetic complications    Post vital signs: stable    Level of consciousness: awake and alert    Nausea/Vomiting: no nausea/vomiting    Complications: none    Transfer of care protocol was followed      Last vitals:   Visit Vitals  BP (!) 190/81   Pulse 65   Temp 37.2 °C (99 °F) (Skin)   Resp 12   Ht 5' 4" (1.626 m)   Wt 63.5 kg (140 lb)   SpO2 100%   BMI 24.03 kg/m²     "

## 2018-02-15 NOTE — ANESTHESIA RELEASE NOTE
"Anesthesia Release from PACU Note    Patient: Parker Carrasco    Procedure(s) Performed: Procedure(s) (LRB):  Upper single or double with MAC or general (N/A)    Anesthesia type: general    Post pain: Adequate analgesia    Post assessment: no apparent anesthetic complications    Last Vitals:   Visit Vitals  BP (!) 169/71   Pulse 62   Temp 37.2 °C (99 °F) (Skin)   Resp (!) 22   Ht 5' 4" (1.626 m)   Wt 63.5 kg (140 lb)   SpO2 100%   BMI 24.03 kg/m²       Post vital signs: stable    Level of consciousness: awake and oriented    Nausea/Vomiting: no nausea/no vomiting    Complications: none    Airway Patency: patent    Respiratory: unassisted, spontaneous ventilation    Cardiovascular: stable    Hydration: euvolemic  "

## 2018-02-15 NOTE — PLAN OF CARE
Pt meets PACU discharge criteria. Pt  signed out of PACU by Dr Das. Report called to WENDIE Chamberlain

## 2018-02-15 NOTE — ANESTHESIA POSTPROCEDURE EVALUATION
"Anesthesia Post Evaluation    Patient: Parker Carrasco    Procedure(s) Performed: Procedure(s) (LRB):  Upper single or double with MAC or general (N/A)    Final Anesthesia Type: general  Patient location during evaluation: PACU  Patient participation: Yes- Able to Participate  Level of consciousness: awake  Post-procedure vital signs: reviewed and stable  Pain management: adequate  Airway patency: patent  PONV status at discharge: No PONV  Anesthetic complications: no      Cardiovascular status: stable  Respiratory status: unassisted, spontaneous ventilation and room air  Hydration status: euvolemic  Follow-up needed         Visit Vitals  BP (!) 169/71   Pulse 62   Temp 37.2 °C (99 °F) (Skin)   Resp (!) 22   Ht 5' 4" (1.626 m)   Wt 63.5 kg (140 lb)   SpO2 100%   BMI 24.03 kg/m²       Pain/Светлана Score: Pain Assessment Performed: Yes (2/15/2018 11:58 AM)  Presence of Pain: denies (2/15/2018 11:58 AM)  Светлана Score: 10 (2/15/2018 11:58 AM)      "

## 2018-02-16 RX ORDER — NITROGLYCERIN 0.4 MG/1
TABLET SUBLINGUAL
Qty: 25 TABLET | Refills: 2 | Status: SHIPPED | OUTPATIENT
Start: 2018-02-16

## 2018-02-16 RX ORDER — ISOSORBIDE DINITRATE 30 MG/1
30 TABLET ORAL 2 TIMES DAILY
Qty: 60 TABLET | Refills: 2 | Status: SHIPPED | OUTPATIENT
Start: 2018-02-16 | End: 2018-05-23 | Stop reason: SDUPTHER

## 2018-02-16 RX ORDER — TAMSULOSIN HYDROCHLORIDE 0.4 MG/1
0.4 CAPSULE ORAL DAILY
Qty: 30 CAPSULE | Refills: 2 | Status: SHIPPED | OUTPATIENT
Start: 2018-02-16 | End: 2018-04-23 | Stop reason: SDUPTHER

## 2018-02-18 PROBLEM — R79.89 ELEVATED TROPONIN: Status: ACTIVE | Noted: 2018-02-18

## 2018-03-01 RX ORDER — CARBIDOPA AND LEVODOPA 10; 100 MG/1; MG/1
TABLET ORAL
Qty: 60 TABLET | Refills: 3 | Status: SHIPPED | OUTPATIENT
Start: 2018-03-01 | End: 2018-07-17 | Stop reason: SDUPTHER

## 2018-03-08 ENCOUNTER — OFFICE VISIT (OUTPATIENT)
Dept: PRIMARY CARE CLINIC | Facility: CLINIC | Age: 83
End: 2018-03-08
Payer: MEDICARE

## 2018-03-08 VITALS
OXYGEN SATURATION: 97 % | TEMPERATURE: 98 F | RESPIRATION RATE: 18 BRPM | HEART RATE: 60 BPM | BODY MASS INDEX: 27.01 KG/M2 | DIASTOLIC BLOOD PRESSURE: 66 MMHG | HEIGHT: 64 IN | SYSTOLIC BLOOD PRESSURE: 159 MMHG | WEIGHT: 158.19 LBS

## 2018-03-08 DIAGNOSIS — I25.10 CORONARY ARTERY DISEASE, ANGINA PRESENCE UNSPECIFIED, UNSPECIFIED VESSEL OR LESION TYPE, UNSPECIFIED WHETHER NATIVE OR TRANSPLANTED HEART: ICD-10-CM

## 2018-03-08 DIAGNOSIS — D64.9 ANEMIA, UNSPECIFIED TYPE: Primary | ICD-10-CM

## 2018-03-08 DIAGNOSIS — N18.30 CHRONIC RENAL IMPAIRMENT, STAGE 3 (MODERATE): ICD-10-CM

## 2018-03-08 PROCEDURE — 99999 PR PBB SHADOW E&M-EST. PATIENT-LVL III: CPT | Mod: PBBFAC,,, | Performed by: INTERNAL MEDICINE

## 2018-03-08 PROCEDURE — 99213 OFFICE O/P EST LOW 20 MIN: CPT | Mod: S$GLB,,, | Performed by: INTERNAL MEDICINE

## 2018-03-09 NOTE — PROGRESS NOTES
Subjective:       Patient ID: Parker Carrasco is a 89 y.o. male.    Chief Complaint: Follow-up (ER f/u bloodloss anemia) and Shortness of Breath (x3 days)    HPI  Pt is hhere with is niece c/o sob cp again off plavix due to GI bleed H/H doing weekly CBC Hct 28's no n/v/d no fever chill no abd pain has CAD tteated medically and heart murmur AS will get stat CBC today  Review of Systems    Objective:      Physical Exam   Constitutional: He is oriented to person, place, and time. He appears well-developed and well-nourished. No distress.   HENT:   Head: Normocephalic and atraumatic.   Right Ear: External ear normal.   Left Ear: External ear normal.   Nose: Nose normal.   Mouth/Throat: Oropharynx is clear and moist. No oropharyngeal exudate.   Hard of hearing   Eyes: Conjunctivae and EOM are normal. Right eye exhibits no discharge. Left eye exhibits no discharge.   Blindness left eye   Neck: Normal range of motion. Neck supple. No thyromegaly present.   Cardiovascular: Normal rate, regular rhythm, normal heart sounds and intact distal pulses.  Exam reveals no gallop and no friction rub.    No murmur heard.  Pulmonary/Chest: Effort normal and breath sounds normal. No respiratory distress. He has no wheezes. He has no rales. He exhibits no tenderness.   Abdominal: Soft. Bowel sounds are normal. He exhibits no distension. There is no tenderness. There is no rebound and no guarding.   Musculoskeletal: Normal range of motion. He exhibits no edema, tenderness or deformity.   Lymphadenopathy:     He has no cervical adenopathy.   Neurological: He is alert and oriented to person, place, and time.   Skin: Skin is warm and dry. Capillary refill takes less than 2 seconds. No rash noted. No erythema.   Psychiatric: He has a normal mood and affect. Judgment and thought content normal.   Nursing note and vitals reviewed.      Assessment:       1. Anemia, unspecified type    2. Chronic renal impairment, stage 3 (moderate)    3. Coronary  artery disease, angina presence unspecified, unspecified vessel or lesion type, unspecified whether native or transplanted heart        Plan:       Anemia, unspecified type  -     CBC auto differential; Future; Expected date: 03/08/2018  -     Comprehensive metabolic panel; Future; Expected date: 03/08/2018    Chronic renal impairment, stage 3 (moderate)    Coronary artery disease, angina presence unspecified, unspecified vessel or lesion type, unspecified whether native or transplanted heart  Comments:  sob cp discuss with pt's niece if anemia again need admit if not still need go to ER for evaluation of his sob cp r/o unstable angina MI

## 2018-03-19 PROBLEM — K55.20 AVM (ARTERIOVENOUS MALFORMATION) OF COLON: Status: ACTIVE | Noted: 2018-03-19

## 2018-03-20 RX ORDER — GABAPENTIN 300 MG/1
CAPSULE ORAL
Qty: 90 CAPSULE | Refills: 1 | Status: SHIPPED | OUTPATIENT
Start: 2018-03-20 | End: 2018-04-23 | Stop reason: SDUPTHER

## 2018-03-21 ENCOUNTER — TELEPHONE (OUTPATIENT)
Dept: NEUROLOGY | Facility: HOSPITAL | Age: 83
End: 2018-03-21

## 2018-03-21 PROBLEM — D62 ACUTE BLOOD LOSS ANEMIA: Status: ACTIVE | Noted: 2018-03-21

## 2018-03-21 NOTE — TELEPHONE ENCOUNTER
"Candelaria niece, requesting repeat SBE.  Pt discharged today after admit for anemia requiring 2 units PRBC.  Pt not taking oral iron supplements or injectofer as recommended.  Per Candelaria "it didn't work last year".  Candelaria notified request to be forwarded to Dr. Bill and she will be contacted.    "

## 2018-03-21 NOTE — TELEPHONE ENCOUNTER
----- Message from Socorro Jeffers sent at 3/21/2018  2:13 PM CDT -----  Contact: Candelaria HAYDEN:  Patient needs to be put on schedule for procedure as previously discussed with Dr. Bill.  Candelaria can be reached at [phone].  Thank you  abc

## 2018-03-23 ENCOUNTER — TELEPHONE (OUTPATIENT)
Dept: PRIMARY CARE CLINIC | Facility: CLINIC | Age: 83
End: 2018-03-23

## 2018-03-23 NOTE — TELEPHONE ENCOUNTER
----- Message from Edouard Craig sent at 3/23/2018 11:01 AM CDT -----  Contact: Criselda/[facility]  Criselda called in regarding the attached patient.  Criselda stated that patient is supposed to be getting weekly CBC blood work done because patient sometimes gets infusions, if needed.    Criselda wanted to see if Dr. Langston can send over an order for CBC work to be done on patient at fax number: [phone] and call back number is: [phone]

## 2018-03-26 ENCOUNTER — OFFICE VISIT (OUTPATIENT)
Dept: HEMATOLOGY/ONCOLOGY | Facility: CLINIC | Age: 83
End: 2018-03-26
Payer: MEDICARE

## 2018-03-26 VITALS
BODY MASS INDEX: 27.26 KG/M2 | SYSTOLIC BLOOD PRESSURE: 156 MMHG | DIASTOLIC BLOOD PRESSURE: 72 MMHG | WEIGHT: 153.88 LBS | TEMPERATURE: 98 F | RESPIRATION RATE: 18 BRPM | HEART RATE: 66 BPM

## 2018-03-26 DIAGNOSIS — D50.0 IRON DEFICIENCY ANEMIA DUE TO CHRONIC BLOOD LOSS: Primary | ICD-10-CM

## 2018-03-26 DIAGNOSIS — D62 ACUTE BLOOD LOSS ANEMIA: ICD-10-CM

## 2018-03-26 DIAGNOSIS — N18.4 ANEMIA, CHRONIC RENAL FAILURE, STAGE 4 (SEVERE): ICD-10-CM

## 2018-03-26 DIAGNOSIS — D64.9 ANEMIA, UNSPECIFIED TYPE: ICD-10-CM

## 2018-03-26 DIAGNOSIS — K92.2 GASTROINTESTINAL HEMORRHAGE, UNSPECIFIED GASTROINTESTINAL HEMORRHAGE TYPE: ICD-10-CM

## 2018-03-26 DIAGNOSIS — R19.5 POSITIVE OCCULT STOOL BLOOD TEST: ICD-10-CM

## 2018-03-26 DIAGNOSIS — K55.20 AVM (ARTERIOVENOUS MALFORMATION) OF COLON: ICD-10-CM

## 2018-03-26 DIAGNOSIS — D63.1 ANEMIA, CHRONIC RENAL FAILURE, STAGE 4 (SEVERE): ICD-10-CM

## 2018-03-26 PROCEDURE — 99213 OFFICE O/P EST LOW 20 MIN: CPT | Mod: ,,, | Performed by: INTERNAL MEDICINE

## 2018-03-26 RX ORDER — SODIUM CHLORIDE 0.9 % (FLUSH) 0.9 %
10 SYRINGE (ML) INJECTION
Status: CANCELLED | OUTPATIENT
Start: 2018-03-29

## 2018-03-26 RX ORDER — HEPARIN 100 UNIT/ML
5 SYRINGE INTRAVENOUS
Status: CANCELLED | OUTPATIENT
Start: 2018-03-29

## 2018-03-26 RX ORDER — HYDROCHLOROTHIAZIDE 25 MG/1
TABLET ORAL
Status: ON HOLD | COMMUNITY
Start: 2018-03-19 | End: 2018-06-23 | Stop reason: HOSPADM

## 2018-03-26 RX ORDER — SODIUM CHLORIDE 9 MG/ML
INJECTION, SOLUTION INTRAVENOUS CONTINUOUS
Status: CANCELLED | OUTPATIENT
Start: 2018-03-29

## 2018-03-26 NOTE — LETTER
March 26, 2018      Rickey Langston MD  [address] [place] [address]           [facility] Hematology Oncology  [address]  [address]  [place] [address]  Phone: [phone]  Fax: [phone]          Patient: Parker Carrasco   MR Number: [id number]   YOB: 1925   Date of Visit: 3/26/2018       Dear Dr. Rickey Langston:    Thank you for referring Parker Carrasco to me for evaluation. Attached you will find relevant portions of my assessment and plan of care.    If you have questions, please do not hesitate to call me. I look forward to following Parker Carrasco along with you.    Sincerely,    Jozef Salcido MD    Enclosure  CC:  No Recipients    If you would like to receive this communication electronically, please contact [email or web address] or [phone] to request more information on [employer] Link access.    For providers and/or their staff who would like to refer a patient to [facility], please contact us through our one-stop-shop provider referral line, [facility], at [phone].    If you feel you have received this communication in error or would no longer like to receive these types of communications, please e-mail [email or web address]

## 2018-03-26 NOTE — PROGRESS NOTES
[facility] Hematology/Oncology  PROGRESS NOTE      Subjective:       Patient ID:   NAME: Parker Carrasco : 1925     89 y.o. male    Referring Doc: Rickey Langston MD  Other Physicians: Codey Ramirez Daniel Raines    Chief Complaint:  Anemia f/u    History of Present Illness:     Patient returns today for a regularly scheduled follow-up visit.  The patient had endoscopy with Dr Bill with GI at [place] and he cauterized several bleeding spots; he required blood transfusion he is here with his niece. He is currently feeling ok with no issues. He denies any CP, SOB, Ha's or N/V.         ROS:   GEN: normal without any fever, night sweats or weight loss  HEENT: normal with no HA's, sore throat, stiff neck, changes in vision  CV: normal with no CP, SOB, PND, YORK or orthopnea  PULM: normal with no SOB, cough, hemoptysis, sputum or pleuritic pain  GI: normal with no abdominal pain, nausea, vomiting, constipation, diarrhea, melanotic stools, BRBPR, or hematemesis  : normal with no hematuria, dysuria  BREAST: normal with no mass, discharge, pain  SKIN: normal with no rash, erythema, bruising, or swelling    Allergies:  Review of patient's allergies indicates:  No Known Allergies    Medications:    Current Outpatient Prescriptions:     albuterol-ipratropium 2.5mg-0.5mg/3mL (DUO-NEB) 0.5 mg-3 mg(2.5 mg base)/3 mL nebulizer solution, Take 3 mLs by nebulization every 6 (six) hours as needed for Wheezing. Rescue, Disp: 1 Box, Rfl: 0    amLODIPine (NORVASC) 2.5 MG tablet, , Disp: , Rfl:     aspirin (ECOTRIN) 81 MG EC tablet, Take 81 mg by mouth once daily., Disp: , Rfl:     carbidopa-levodopa  mg (SINEMET)  mg per tablet, ONE TABLET BY MOUTH TWICE DAILY 30 days FOR PARKINSON'S, Disp: 60 tablet, Rfl: 3    carvedilol (COREG) 3.125 MG tablet, Take 1 tablet (3.125 mg total) by mouth 2 (two) times daily with meals., Disp: 60 tablet, Rfl: 11    ferrous sulfate 325 (65 FE) MG EC tablet, Take 1 tablet (325 mg total)  by mouth 2 (two) times daily with meals., Disp: , Rfl: 0    finasteride (PROSCAR) 5 mg tablet, Take 1 tablet (5 mg total) by mouth once daily., Disp: 30 tablet, Rfl: 11    gabapentin (NEURONTIN) 300 MG capsule, TAKE ONE CAPSULE BY MOUTH THREE TIMES DAILY, Disp: 90 capsule, Rfl: 1    hydroCHLOROthiazide (HYDRODIURIL) 25 MG tablet, , Disp: , Rfl:     isosorbide dinitrate (ISORDIL) 30 MG Tab, Take 1 tablet (30 mg total) by mouth 2 (two) times daily., Disp: 60 tablet, Rfl: 2    lovastatin (MEVACOR) 20 MG tablet, Take 1 tablet (20 mg total) by mouth every evening., Disp: 90 tablet, Rfl: 3    nitroGLYCERIN (NITROSTAT) 0.4 MG SL tablet, 1 tablet SL q 5 minutes x 3 prn, Disp: 25 tablet, Rfl: 2    pantoprazole (PROTONIX) 40 MG tablet, Take 1 tablet (40 mg total) by mouth once daily., Disp: 90 tablet, Rfl: 3    paroxetine (PAXIL) 20 MG tablet, Take 1 tablet (20 mg total) by mouth once daily., Disp: 30 tablet, Rfl: 5    tamsulosin (FLOMAX) 0.4 mg Cp24, Take 1 capsule (0.4 mg total) by mouth once daily., Disp: 30 capsule, Rfl: 2    traMADol (ULTRAM) 50 mg tablet, Take 50 mg by mouth every 6 (six) hours as needed for Pain., Disp: , Rfl:     PMHx/PSHx Updates:  See patient's last visit with me on 12/14/2017.  See H&P on 11/17/2017        Pathology:  Cancer Staging  No matching staging information was found for the patient.          Objective:     Vitals:  Blood pressure (!) 156/72, pulse 66, temperature 97.5 °F (36.4 °C), resp. rate 18, weight 69.8 kg (153 lb 14.4 oz).    Physical Examination:   GEN: no apparent distress, comfortable; AAOx3  HEAD: atraumatic and normocephalic  EYES: no pallor, no icterus, PERRLA; left enucleated  ENT: OMM, no pharyngeal erythema, external ears WNL; no nasal discharge; no thrush  NECK: no masses, thyroid normal, trachea midline, no LAD/LN's, supple  CV: RRR with no murmur; normal pulse; normal S1 and S2; no pedal edema  CHEST: Normal respiratory effort; CTAB; normal breath sounds; no  wheeze or crackles  ABDOM: nontender and nondistended; soft; normal bowel sounds; no rebound/guarding  MUSC/Skeletal: ROM normal; no crepitus; joints normal; no deformities or arthropathy; uses walker  EXTREM: no clubbing, cyanosis, inflammation or swelling  SKIN: no rashes, lesions, ulcers, petechiae or subcutaneous nodules  : no brand  NEURO: grossly intact; motor/sensory WNL; AAOx3; no tremors  PSYCH: normal mood, affect and behavior  LYMPH: normal cervical, supraclavicular, axillary and groin LN's            Labs:   3/20/2018  Lab Results   Component Value Date    IRON 49 01/19/2018    TIBC 440 01/19/2018    FERRITIN 41 12/11/2017     Lab Results   Component Value Date    WBC 9.50 03/20/2018    HGB 9.6 (L) 03/20/2018    HCT 28.4 (L) 03/20/2018    MCV 88 03/20/2018     03/20/2018     BMP  Lab Results   Component Value Date     (L) 03/20/2018    K 3.5 (L) 03/20/2018     03/20/2018    CO2 25 03/20/2018    BUN 52 (H) 03/20/2018    CREATININE 1.7 (H) 03/20/2018    CALCIUM 8.4 (L) 03/20/2018    ANIONGAP 8 03/20/2018    ESTGFRAFRICA 39.6 (A) 03/20/2018    EGFRNONAA 34.2 (A) 03/20/2018     Lab Results   Component Value Date    ALT 17 03/20/2018    AST 23 03/20/2018    ALKPHOS 143 (H) 03/20/2018    BILITOT 0.6 03/20/2018             Radiology/Diagnostic Studies:    No results found.    I have reviewed all available lab results and radiology reports.    Assessment/Plan:   (1) 89 y.o. male with diagnosis of transfusion dependant anemia who was seen as a consult at [facility] in Oct 2017 by my associate Dr CHARLY Morrison  - [facility] parameters  - multifactorial etiology with chronic GIB and anemia of chronic renal disease  - iron deficiency from the chronic GIB with OBS positive on multiple tests  - ferritin is up to 41 and better; epo was 10.2; haptoglobin was 87  - he is followed by Dr Ramirez with GI and had colonoscopy in July 2017  - total bili was wnl so I do not suspect any underlying hemolysis  - s/p  endoscopy with Dr Russell Bill at [place] and had cauterized bleeding spots in the jejunum  - he has required blood transfusion twice since then     (2) CAD s/p CABG in past; CHF and paroxysmal atrial fib - followed by Dr Alegre in [facility] and Dr Oliveira in [facility]     (3) Aortic valve stenosis     (4) BPH with elevated PSA     (5) Hx/of right partial colectomy and stomach ulcers (PUD)     (6) HTN and macular degeneration     (7) Chronic COPD/emphysema and pulmonary HTN; former smoker     (8) CRI - he does not have a kidney specialist         PLAN:  1. F/u with GI, PCP, etc  2. Set up two more IV irons  3.check labs weekly with Hgb/HCT and CBC/iron panel monthly  4. F/u with GI and PCP  5. If hgb drops any lower, then will transfuse again  RTC in 4 weeks  Fax note to Rickey Langston MD, Codey Ramirez Raines    Discussion:     I have explained all of the above in detail and the patient understands all of the current recommendation(s). I have answered all of their questions to the best of my ability and to their complete satisfaction.   The patient is to continue with the current management plan.            Electronically signed by Jozef Salcido MD

## 2018-03-29 ENCOUNTER — TELEPHONE (OUTPATIENT)
Dept: PRIMARY CARE CLINIC | Facility: CLINIC | Age: 83
End: 2018-03-29

## 2018-03-29 NOTE — TELEPHONE ENCOUNTER
Spoke with Criselda with Home Health in regards to patient. Patient just got a transfusion 2 weeks ago and the nurse wants to know if there could be a standing order for the patient to get a CBC drawn once weekly. The nurse did draw a CBC last week and will draw one this week and will wait for the order. Please advise.

## 2018-04-12 RX ORDER — SODIUM CHLORIDE 0.9 % (FLUSH) 0.9 %
10 SYRINGE (ML) INJECTION
Status: CANCELLED | OUTPATIENT
Start: 2018-04-12

## 2018-04-12 RX ORDER — SODIUM CHLORIDE 9 MG/ML
INJECTION, SOLUTION INTRAVENOUS CONTINUOUS
Status: CANCELLED | OUTPATIENT
Start: 2018-04-12

## 2018-04-12 RX ORDER — HEPARIN 100 UNIT/ML
5 SYRINGE INTRAVENOUS
Status: CANCELLED | OUTPATIENT
Start: 2018-04-12

## 2018-04-18 LAB
BASOPHILS NFR BLD: 0 K/UL (ref 0–0.2)
BASOPHILS NFR BLD: 0.6 %
EOSINOPHIL NFR BLD: 0.4 K/UL (ref 0–0.7)
EOSINOPHIL NFR BLD: 5.8 %
ERYTHROCYTE [DISTWIDTH] IN BLOOD BY AUTOMATED COUNT: 21.2 % (ref 11.7–14.9)
GRAN #: 4.4 K/UL (ref 1.4–6.5)
GRAN%: 66.7 %
HCT VFR BLD AUTO: 28.1 % (ref 39–55)
HGB BLD-MCNC: 9.1 G/DL (ref 14–16)
IMMATURE GRANS (ABS): 0.1 K/UL (ref 0–1)
IMMATURE GRANULOCYTES: 1.1 %
LYMPH #: 1.1 K/UL (ref 1.2–3.4)
LYMPH%: 16.5 %
MCH RBC QN AUTO: 33.2 PG (ref 25–35)
MCHC RBC AUTO-ENTMCNC: 32.4 G/DL (ref 31–36)
MCV RBC AUTO: 102.6 FL (ref 80–100)
MONO #: 0.6 K/UL (ref 0.1–0.6)
MONO%: 9.3 %
NUCLEATED RBCS: 0 %
PLATELET # BLD AUTO: 175 K/UL (ref 140–440)
PMV BLD AUTO: 9.5 FL (ref 8.8–12.7)
RBC # BLD AUTO: 2.74 M/UL (ref 4.3–5.9)
WBC # BLD AUTO: 6.5 K/UL (ref 5–10)

## 2018-04-19 RX ORDER — SODIUM CHLORIDE 0.9 % (FLUSH) 0.9 %
10 SYRINGE (ML) INJECTION
Status: CANCELLED | OUTPATIENT
Start: 2018-04-19

## 2018-04-19 RX ORDER — HEPARIN 100 UNIT/ML
5 SYRINGE INTRAVENOUS
Status: CANCELLED | OUTPATIENT
Start: 2018-04-19

## 2018-04-19 RX ORDER — SODIUM CHLORIDE 9 MG/ML
INJECTION, SOLUTION INTRAVENOUS CONTINUOUS
Status: CANCELLED | OUTPATIENT
Start: 2018-04-19

## 2018-04-23 ENCOUNTER — OFFICE VISIT (OUTPATIENT)
Dept: HEMATOLOGY/ONCOLOGY | Facility: CLINIC | Age: 83
End: 2018-04-23
Payer: MEDICARE

## 2018-04-23 VITALS
BODY MASS INDEX: 26.03 KG/M2 | RESPIRATION RATE: 18 BRPM | DIASTOLIC BLOOD PRESSURE: 62 MMHG | TEMPERATURE: 98 F | WEIGHT: 152.5 LBS | HEIGHT: 64 IN | HEART RATE: 63 BPM | SYSTOLIC BLOOD PRESSURE: 142 MMHG

## 2018-04-23 DIAGNOSIS — D63.1 ANEMIA, CHRONIC RENAL FAILURE, STAGE 4 (SEVERE): ICD-10-CM

## 2018-04-23 DIAGNOSIS — K55.20 AVM (ARTERIOVENOUS MALFORMATION) OF COLON: ICD-10-CM

## 2018-04-23 DIAGNOSIS — R19.5 POSITIVE OCCULT STOOL BLOOD TEST: Primary | ICD-10-CM

## 2018-04-23 DIAGNOSIS — K92.2 GASTROINTESTINAL HEMORRHAGE, UNSPECIFIED GASTROINTESTINAL HEMORRHAGE TYPE: ICD-10-CM

## 2018-04-23 DIAGNOSIS — D64.9 ANEMIA, UNSPECIFIED TYPE: ICD-10-CM

## 2018-04-23 DIAGNOSIS — N18.4 ANEMIA, CHRONIC RENAL FAILURE, STAGE 4 (SEVERE): ICD-10-CM

## 2018-04-23 DIAGNOSIS — D50.0 IRON DEFICIENCY ANEMIA DUE TO CHRONIC BLOOD LOSS: ICD-10-CM

## 2018-04-23 DIAGNOSIS — D62 ACUTE BLOOD LOSS ANEMIA: ICD-10-CM

## 2018-04-23 PROCEDURE — 99213 OFFICE O/P EST LOW 20 MIN: CPT | Mod: ,,, | Performed by: INTERNAL MEDICINE

## 2018-04-23 NOTE — LETTER
April 23, 2018      Rickey Langston MD  [address] [place] [address]           [facility]  [address]  [address]  [place] [address]  Phone: [phone]  Fax: [phone]          Patient: Parker Carrasco   MR Number: [id number]   YOB: 1925   Date of Visit: 4/23/2018       Dear Dr. Rickey Langston:    Thank you for referring Parker Carrasco to me for evaluation. Attached you will find relevant portions of my assessment and plan of care.    If you have questions, please do not hesitate to call me. I look forward to following Parker Carrasco along with you.    Sincerely,    Jozef Salcido MD    Enclosure  CC:  No Recipients    If you would like to receive this communication electronically, please contact [email or web address] or [phone] to request more information on [employer] Link access.    For providers and/or their staff who would like to refer a patient to [facility], please contact us through our one-stop-shop provider referral line, [facility], at [phone].    If you feel you have received this communication in error or would no longer like to receive these types of communications, please e-mail [email or web address]

## 2018-04-23 NOTE — PROGRESS NOTES
[facility] Hematology/Oncology  PROGRESS NOTE      Subjective:       Patient ID:   NAME: Parker Carrasco : 1925     89 y.o. male    Referring Doc: Kian  Other Physicians: Codey Ramirez, Russell Bill    Chief Complaint:  Anemia f/u    History of Present Illness:     Patient returns today for a regularly scheduled follow-up visit.  He had bout of CHF  Couple of weeks ago and had visit at ER. He is here with his daughter. He denies any CP, SOB, Ha's or N/V. He sees Dr Alegre tomorrow. He received two treatments of IV iron with the last one last week. He did well with the IV iron.         ROS:   GEN: normal without any fever, night sweats or weight loss  HEENT: normal with no HA's, sore throat, stiff neck, changes in vision  CV: normal with no CP, SOB, PND, YORK or orthopnea  PULM: normal with no SOB, cough, hemoptysis, sputum or pleuritic pain  GI: normal with no abdominal pain, nausea, vomiting, constipation, diarrhea, melanotic stools, BRBPR, or hematemesis  : normal with no hematuria, dysuria  BREAST: normal with no mass, discharge, pain  SKIN: normal with no rash, erythema, bruising, or swelling    Allergies:  Review of patient's allergies indicates:  No Known Allergies    Medications:    Current Outpatient Prescriptions:     albuterol-ipratropium 2.5mg-0.5mg/3mL (DUO-NEB) 0.5 mg-3 mg(2.5 mg base)/3 mL nebulizer solution, Take 3 mLs by nebulization every 6 (six) hours as needed for Wheezing. Rescue, Disp: 1 Box, Rfl: 0    amLODIPine (NORVASC) 2.5 MG tablet, , Disp: , Rfl:     aspirin (ECOTRIN) 81 MG EC tablet, Take 81 mg by mouth once daily., Disp: , Rfl:     carbidopa-levodopa  mg (SINEMET)  mg per tablet, ONE TABLET BY MOUTH TWICE DAILY 30 days FOR PARKINSON'S, Disp: 60 tablet, Rfl: 3    carvedilol (COREG) 3.125 MG tablet, Take 1 tablet (3.125 mg total) by mouth 2 (two) times daily with meals., Disp: 60 tablet, Rfl: 11    ferrous sulfate 325 (65 FE) MG EC tablet, Take 1 tablet (325 mg total)  "by mouth 2 (two) times daily with meals., Disp: , Rfl: 0    finasteride (PROSCAR) 5 mg tablet, Take 1 tablet (5 mg total) by mouth once daily., Disp: 30 tablet, Rfl: 11    gabapentin (NEURONTIN) 300 MG capsule, TAKE ONE CAPSULE BY MOUTH THREE TIMES DAILY, Disp: 90 capsule, Rfl: 1    hydroCHLOROthiazide (HYDRODIURIL) 25 MG tablet, , Disp: , Rfl:     isosorbide dinitrate (ISORDIL) 30 MG Tab, Take 1 tablet (30 mg total) by mouth 2 (two) times daily., Disp: 60 tablet, Rfl: 2    lovastatin (MEVACOR) 20 MG tablet, Take 1 tablet (20 mg total) by mouth every evening., Disp: 90 tablet, Rfl: 3    nitroGLYCERIN (NITROSTAT) 0.4 MG SL tablet, 1 tablet SL q 5 minutes x 3 prn, Disp: 25 tablet, Rfl: 2    pantoprazole (PROTONIX) 40 MG tablet, Take 1 tablet (40 mg total) by mouth once daily., Disp: 90 tablet, Rfl: 3    paroxetine (PAXIL) 20 MG tablet, Take 1 tablet (20 mg total) by mouth once daily., Disp: 30 tablet, Rfl: 5    tamsulosin (FLOMAX) 0.4 mg Cp24, Take 1 capsule (0.4 mg total) by mouth once daily., Disp: 30 capsule, Rfl: 2    traMADol (ULTRAM) 50 mg tablet, Take 50 mg by mouth every 6 (six) hours as needed for Pain., Disp: , Rfl:     PMHx/PSHx Updates:  See patient's last visit with me on  3/26/2018.  See H&P on 11/17/2017        Pathology:  none          Objective:     Vitals:  Blood pressure (!) 142/62, pulse 63, temperature 97.8 °F (36.6 °C), resp. rate 18, height 5' 4" (1.626 m), weight 69.2 kg (152 lb 8 oz).    Physical Examination:   GEN: no apparent distress, comfortable; AAOx3  HEAD: atraumatic and normocephalic  EYES: no pallor, no icterus, PERRLA; left enucleated  ENT: OMM, no pharyngeal erythema, external ears WNL; no nasal discharge; no thrush  NECK: no masses, thyroid normal, trachea midline, no LAD/LN's, supple  CV: RRR with no murmur; normal pulse; normal S1 and S2; no pedal edema  CHEST: Normal respiratory effort; CTAB; normal breath sounds; no wheeze or crackles  ABDOM: nontender and " nondistended; soft; normal bowel sounds; no rebound/guarding  MUSC/Skeletal: ROM normal; no crepitus; joints normal; no deformities or arthropathy; uses walker  EXTREM: no clubbing, cyanosis, inflammation or swelling  SKIN: no rashes, lesions, ulcers, petechiae or subcutaneous nodules  : no brand  NEURO: grossly intact; motor/sensory WNL; AAOx3; no tremors  PSYCH: normal mood, affect and behavior  LYMPH: normal cervical, supraclavicular, axillary and groin LN's            Labs:   4/20/2018  Lab Results   Component Value Date    IRON 339 (H) 04/20/2018    TIBC 306 04/20/2018    FERRITIN 464 (H) 04/20/2018     Lab Results   Component Value Date    WBC 6.50 04/20/2018    HGB 9.8 (L) 04/20/2018    HCT 28.7 (L) 04/20/2018    MCV 98 04/20/2018     04/20/2018     BMP  Lab Results   Component Value Date     (L) 04/20/2018    K 3.9 04/20/2018    CL 98 (L) 04/20/2018    CO2 27 04/20/2018    BUN 46 (H) 04/20/2018    CREATININE 2.1 (H) 04/20/2018    CALCIUM 8.4 (L) 04/20/2018    ANIONGAP 9 04/20/2018    ESTGFRAFRICA 30.7 (A) 04/20/2018    EGFRNONAA 26.5 (A) 04/20/2018     Lab Results   Component Value Date    ALT 10 (L) 04/20/2018    AST 19 04/20/2018    ALKPHOS 127 (H) 04/20/2018    BILITOT 0.5 04/20/2018             Radiology/Diagnostic Studies:    No results found.    I have reviewed all available lab results and radiology reports.    Assessment/Plan:   (1) 89 y.o. male with diagnosis of transfusion dependant anemia who was seen as a consult at [facility] in Oct 2017 by my associate Dr CHARLY Morrison  - [facility] parameters  - multifactorial etiology with chronic GIB and anemia of chronic renal disease  - iron deficiency from the chronic GIB with OBS positive on multiple tests  - ferritin is up to 464 and better; iron better at 339  - he is followed by Dr Ramirez with GI and had colonoscopy in July 2017  - total bili was wnl so I do not suspect any underlying hemolysis  - s/p endoscopy with Dr Russell Bill at [place] and  had cauterized bleeding spots in the jejunum  - he has received two treatments of IV iron  - hgb now up to 9.8     (2) CAD s/p CABG in past; CHF and paroxysmal atrial fib - followed by Dr Alegre in [facility] and Dr Oliveira in [facility]     (3) Aortic valve stenosis     (4) BPH with elevated PSA     (5) Hx/of right partial colectomy and stomach ulcers (PUD)     (6) HTN and macular degeneration     (7) Chronic COPD/emphysema and pulmonary HTN; former smoker     (8) CRI - he does not have a kidney specialist         PLAN:  1. F/u with GI, PCP, etc  2.  Check labs monthly for now and repeat IV iron as needed  3. F/u with GI, card and PCP    RTC in  3 months  Fax note to Rickey Langston MD, Codey Ramirez Raines    Discussion:     I have explained all of the above in detail and the patient understands all of the current recommendation(s). I have answered all of their questions to the best of my ability and to their complete satisfaction.   The patient is to continue with the current management plan.            Electronically signed by Jozef Salcido MD

## 2018-04-24 RX ORDER — GABAPENTIN 300 MG/1
CAPSULE ORAL
Qty: 90 CAPSULE | Refills: 1 | Status: ON HOLD | OUTPATIENT
Start: 2018-04-24 | End: 2018-04-29 | Stop reason: HOSPADM

## 2018-04-24 RX ORDER — TAMSULOSIN HYDROCHLORIDE 0.4 MG/1
CAPSULE ORAL
Qty: 30 CAPSULE | Refills: 5 | Status: SHIPPED | OUTPATIENT
Start: 2018-04-24 | End: 2018-11-14 | Stop reason: SDUPTHER

## 2018-04-26 ENCOUNTER — OFFICE VISIT (OUTPATIENT)
Dept: PRIMARY CARE CLINIC | Facility: CLINIC | Age: 83
End: 2018-04-26
Payer: MEDICARE

## 2018-04-26 VITALS
SYSTOLIC BLOOD PRESSURE: 101 MMHG | TEMPERATURE: 98 F | DIASTOLIC BLOOD PRESSURE: 48 MMHG | RESPIRATION RATE: 18 BRPM | HEART RATE: 65 BPM | BODY MASS INDEX: 26.14 KG/M2 | OXYGEN SATURATION: 92 % | WEIGHT: 152.31 LBS

## 2018-04-26 DIAGNOSIS — J32.9 SINUSITIS, UNSPECIFIED CHRONICITY, UNSPECIFIED LOCATION: Primary | ICD-10-CM

## 2018-04-26 DIAGNOSIS — M47.816 OSTEOARTHRITIS OF LUMBAR SPINE, UNSPECIFIED SPINAL OSTEOARTHRITIS COMPLICATION STATUS: ICD-10-CM

## 2018-04-26 DIAGNOSIS — M17.0 PRIMARY OSTEOARTHRITIS OF BOTH KNEES: ICD-10-CM

## 2018-04-26 DIAGNOSIS — J40 BRONCHITIS: ICD-10-CM

## 2018-04-26 PROCEDURE — 99999 PR PBB SHADOW E&M-EST. PATIENT-LVL III: CPT | Mod: PBBFAC,,, | Performed by: INTERNAL MEDICINE

## 2018-04-26 PROCEDURE — 99213 OFFICE O/P EST LOW 20 MIN: CPT | Mod: 25,S$GLB,, | Performed by: INTERNAL MEDICINE

## 2018-04-26 PROCEDURE — 96372 THER/PROPH/DIAG INJ SC/IM: CPT | Mod: 59,S$GLB,, | Performed by: INTERNAL MEDICINE

## 2018-04-26 RX ORDER — BETAMETHASONE SODIUM PHOSPHATE AND BETAMETHASONE ACETATE 3; 3 MG/ML; MG/ML
6 INJECTION, SUSPENSION INTRA-ARTICULAR; INTRALESIONAL; INTRAMUSCULAR; SOFT TISSUE
Status: DISCONTINUED | OUTPATIENT
Start: 2018-04-26 | End: 2018-04-26

## 2018-04-26 RX ORDER — AZITHROMYCIN 250 MG/1
TABLET, FILM COATED ORAL
Qty: 6 TABLET | Refills: 0 | Status: SHIPPED | OUTPATIENT
Start: 2018-04-26 | End: 2018-05-01

## 2018-04-26 RX ORDER — CEFTRIAXONE 1 G/1
1 INJECTION, POWDER, FOR SOLUTION INTRAMUSCULAR; INTRAVENOUS
Status: COMPLETED | OUTPATIENT
Start: 2018-04-26 | End: 2018-04-26

## 2018-04-26 RX ORDER — BETAMETHASONE SODIUM PHOSPHATE AND BETAMETHASONE ACETATE 3; 3 MG/ML; MG/ML
12 INJECTION, SUSPENSION INTRA-ARTICULAR; INTRALESIONAL; INTRAMUSCULAR; SOFT TISSUE
Status: COMPLETED | OUTPATIENT
Start: 2018-04-26 | End: 2018-04-26

## 2018-04-26 RX ORDER — TRAMADOL HYDROCHLORIDE 50 MG/1
50 TABLET ORAL EVERY 12 HOURS PRN
Qty: 60 TABLET | Refills: 0 | Status: SHIPPED | OUTPATIENT
Start: 2018-04-26 | End: 2018-09-08 | Stop reason: SDUPTHER

## 2018-04-26 RX ORDER — GUAIFENESIN/DEXTROMETHORPHAN 100-10MG/5
5 SYRUP ORAL 4 TIMES DAILY PRN
Qty: 150 ML | Refills: 0 | Status: ON HOLD | OUTPATIENT
Start: 2018-04-26 | End: 2018-04-29 | Stop reason: HOSPADM

## 2018-04-26 RX ADMIN — CEFTRIAXONE 1 G: 1 INJECTION, POWDER, FOR SOLUTION INTRAMUSCULAR; INTRAVENOUS at 02:04

## 2018-04-26 RX ADMIN — BETAMETHASONE SODIUM PHOSPHATE AND BETAMETHASONE ACETATE 12 MG: 3; 3 INJECTION, SUSPENSION INTRA-ARTICULAR; INTRALESIONAL; INTRAMUSCULAR; SOFT TISSUE at 02:04

## 2018-04-26 NOTE — PROGRESS NOTES
Patient ID by name and . NKDA. Celestone 12 mg IM injection given in right ventrogluteal and Ceftriaxone 1 gram IM injection given in left ventrogluteal using aseptic technique. Aspirated with no blood noted. Patient tolerated well. Given per physicians order. No adverse reactions noted.

## 2018-04-27 PROBLEM — E86.0 DEHYDRATION: Status: ACTIVE | Noted: 2018-04-27

## 2018-04-27 NOTE — PROGRESS NOTES
Subjective:       Patient ID: Parker Carrasco is a 89 y.o. male.    Chief Complaint: Sore Throat (pt. is also weak) and Cough    HPI   Pt c/o coughing a lot congestion fever couple days not better had labs recently anemia stable no sob cp n n/v/d chronic back pian bilateral knee pain from OA  Review of Systems    Objective:      Physical Exam   Constitutional: He is oriented to person, place, and time. He appears well-developed and well-nourished. No distress.   HENT:   Head: Normocephalic and atraumatic.   Right Ear: External ear normal.   Left Ear: External ear normal.   Mouth/Throat: Oropharynx is clear and moist. No oropharyngeal exudate.   Nasal congestion wet cough   Eyes: Conjunctivae and EOM are normal. Pupils are equal, round, and reactive to light. Right eye exhibits no discharge. Left eye exhibits no discharge.   Neck: Normal range of motion. Neck supple. No thyromegaly present.   Cardiovascular: Normal rate, regular rhythm, normal heart sounds and intact distal pulses.  Exam reveals no gallop and no friction rub.    No murmur heard.  Pulmonary/Chest: Effort normal. No respiratory distress. He has no wheezes. He has rales (mild rhonchi). He exhibits no tenderness.   Abdominal: Soft. Bowel sounds are normal. He exhibits no distension. There is no tenderness. There is no rebound and no guarding.   Musculoskeletal: Normal range of motion. He exhibits no edema, tenderness or deformity.   Lymphadenopathy:     He has no cervical adenopathy.   Neurological: He is alert and oriented to person, place, and time.   Skin: Skin is warm and dry. Capillary refill takes less than 2 seconds. No rash noted. No erythema.   Psychiatric: He has a normal mood and affect. Judgment and thought content normal.   Nursing note and vitals reviewed.      Assessment:       1. Sinusitis, unspecified chronicity, unspecified location    2. Bronchitis    3. Primary osteoarthritis of both knees    4. Osteoarthritis of lumbar spine,  unspecified spinal osteoarthritis complication status        Plan:       Sinusitis, unspecified chronicity, unspecified location  -     betamethasone acetate-betamethasone sodium phosphate injection 12 mg; Inject 2 mLs (12 mg total) into the muscle one time.    Bronchitis    Primary osteoarthritis of both knees    Osteoarthritis of lumbar spine, unspecified spinal osteoarthritis complication status    Other orders  -     Discontinue: betamethasone acetate-betamethasone sodium phosphate injection 6 mg; Inject 1 mL (6 mg total) into the muscle one time.  -     cefTRIAXone injection 1 g; Inject 1 g into the muscle one time.  -     azithromycin (Z-VIRA) 250 MG tablet; Take 2 tablets by mouth on day 1; Take 1 tablet by mouth on days 2-5  Dispense: 6 tablet; Refill: 0  -     dextromethorphan-guaifenesin  mg/5 ml (ROBITUSSIN-DM)  mg/5 mL liquid; Take 5 mLs by mouth 4 (four) times daily as needed.  Dispense: 150 mL; Refill: 0  -     traMADol (ULTRAM) 50 mg tablet; Take 1 tablet (50 mg total) by mouth every 12 (twelve) hours as needed for Pain.  Dispense: 60 tablet; Refill: 0

## 2018-04-30 ENCOUNTER — TELEPHONE (OUTPATIENT)
Dept: HEMATOLOGY/ONCOLOGY | Facility: CLINIC | Age: 83
End: 2018-04-30

## 2018-04-30 NOTE — TELEPHONE ENCOUNTER
Called pt number   Pt went into hospital over the weekend and received 2 units of blood feeling better

## 2018-05-02 ENCOUNTER — TELEPHONE (OUTPATIENT)
Dept: NEUROLOGY | Facility: HOSPITAL | Age: 83
End: 2018-05-02

## 2018-05-02 NOTE — TELEPHONE ENCOUNTER
----- Message from Marina Krishna sent at 5/2/2018 11:38 AM CDT -----  Contact: Patients daughter  GI-Patients niece, Candelaria Called to give update. Patient was in the hospital last week for a blood transfusions. Candelaria's call back number is [phone].

## 2018-05-03 ENCOUNTER — TELEPHONE (OUTPATIENT)
Dept: HEMATOLOGY/ONCOLOGY | Facility: CLINIC | Age: 83
End: 2018-05-03

## 2018-05-03 ENCOUNTER — TELEPHONE (OUTPATIENT)
Dept: PRIMARY CARE CLINIC | Facility: CLINIC | Age: 83
End: 2018-05-03

## 2018-05-03 ENCOUNTER — TELEPHONE (OUTPATIENT)
Dept: NEUROLOGY | Facility: HOSPITAL | Age: 83
End: 2018-05-03

## 2018-05-03 NOTE — TELEPHONE ENCOUNTER
----- Message from Gabriella Daly sent at 5/3/2018 11:56 AM CDT -----  rachel with [facility] called in asked that dr yates nurse call her back in regards to. She said pt is getting little irrtated with home health doing there services and says she wants to see how they felt about them discharging pt and have him go to a lab facility to get labs done. She explained that despite his age he gets around very well.  Call back [phone]

## 2018-05-03 NOTE — TELEPHONE ENCOUNTER
Called rachel and talked to Dr. Omari Ramirez Said he did not need labs but once a month and that he was agreeable to what ever his other Dr's wanted

## 2018-05-03 NOTE — TELEPHONE ENCOUNTER
----- Message from Ashley Painter sent at 5/3/2018  3:59 PM CDT -----  Contact: kylie HAYDEN- Kylie called returning nurse's call call back number is [phone]

## 2018-05-03 NOTE — TELEPHONE ENCOUNTER
----- Message from Bipin Duarte sent at 5/3/2018  9:36 AM CDT -----  Contact: Makenna  Type: Needs Medical Advice    Who Called:  Makenna  Symptoms (please be specific):  n/a  How long has patient had these symptoms:  n/a  Pharmacy name and phone #:  n/a  Best Call Back Number: [phone]  Additional Information: Makenna with [facility] called to verify if  wants labs drawn on patient,she stated that patient was just discharged on 04/29

## 2018-05-03 NOTE — TELEPHONE ENCOUNTER
Candelaria, niece, pt has had several transfusions after both oral iron and parenteral iron times 2.  Pt's anemia fails to improve.  Candelaria notified pt's status to be forwarded to Dr. Bill and she will be contacted with recommendations.

## 2018-05-04 NOTE — TELEPHONE ENCOUNTER
----- Message from Tenisha Lombarod sent at 5/4/2018  2:35 PM CDT -----  Contact: Sinan with [facility] phone [phone]  Sinan with [facility] phone [phone], Calling in regards to his home health do you want them to continue drawing a weekly CBC. Please call her. Thanks.

## 2018-05-04 NOTE — TELEPHONE ENCOUNTER
Spoke to  nurse notified her that per Dr. Langston. HH is to continue to draw CBC once a week. States understanding

## 2018-05-07 ENCOUNTER — TELEPHONE (OUTPATIENT)
Dept: NEUROLOGY | Facility: HOSPITAL | Age: 83
End: 2018-05-07

## 2018-05-07 DIAGNOSIS — D50.0 IRON DEFICIENCY ANEMIA SECONDARY TO BLOOD LOSS (CHRONIC): Primary | ICD-10-CM

## 2018-05-07 NOTE — TELEPHONE ENCOUNTER
Upper DBE scheduled with Candelaria, daughter, on Thursday, May 17, 2018.  Candelaria instructed to have pt eat light meals on Wednesday, May 16, 2018, with nothing to eat or drink after midnight.  Pt to take 1 Plavix 300mg tablet at 6pm on Wednesday, May 16, 2018.  Pt to arrive at [facility] 1st floor admit at 930am on this date.  Candelaria repeated all information correctly.

## 2018-05-14 ENCOUNTER — TELEPHONE (OUTPATIENT)
Dept: NEUROLOGY | Facility: HOSPITAL | Age: 83
End: 2018-05-14

## 2018-05-14 NOTE — TELEPHONE ENCOUNTER
----- Message from Russell Bill MD sent at 5/14/2018  9:34 AM CDT -----  Tacos Stone!   Can you try to move Mr. Carrasco's upper DBE to either next Monday, May 21st or Thursday, May 24th?   Thanks!

## 2018-05-14 NOTE — TELEPHONE ENCOUNTER
Upper DBE rescheduled with Candelaria on Monday, May 21, 2018. Candelaria repeated date correctly.

## 2018-05-16 ENCOUNTER — TELEPHONE (OUTPATIENT)
Dept: NEUROLOGY | Facility: HOSPITAL | Age: 83
End: 2018-05-16

## 2018-05-16 NOTE — TELEPHONE ENCOUNTER
----- Message from Bertha Brown LPN sent at 5/7/2018  9:14 AM CDT -----  Upper DBE on May 17, 2018.  CPT 53593, DX-D50.0.    Thanks

## 2018-05-17 PROBLEM — I50.22 CHRONIC SYSTOLIC CONGESTIVE HEART FAILURE: Status: ACTIVE | Noted: 2018-05-17

## 2018-05-18 PROBLEM — D50.0 ANEMIA DUE TO BLOOD LOSS: Status: ACTIVE | Noted: 2018-05-18

## 2018-05-20 ENCOUNTER — ANESTHESIA EVENT (OUTPATIENT)
Dept: ENDOSCOPY | Facility: HOSPITAL | Age: 83
End: 2018-05-20
Payer: MEDICARE

## 2018-05-21 ENCOUNTER — SURGERY (OUTPATIENT)
Age: 83
End: 2018-05-21

## 2018-05-21 ENCOUNTER — ANESTHESIA (OUTPATIENT)
Dept: ENDOSCOPY | Facility: HOSPITAL | Age: 83
End: 2018-05-21
Payer: MEDICARE

## 2018-05-21 ENCOUNTER — HOSPITAL ENCOUNTER (OUTPATIENT)
Facility: HOSPITAL | Age: 83
Discharge: HOME | End: 2018-05-21
Attending: INTERNAL MEDICINE | Admitting: INTERNAL MEDICINE
Payer: MEDICARE

## 2018-05-21 VITALS
SYSTOLIC BLOOD PRESSURE: 141 MMHG | BODY MASS INDEX: 23.9 KG/M2 | WEIGHT: 140 LBS | RESPIRATION RATE: 12 BRPM | DIASTOLIC BLOOD PRESSURE: 67 MMHG | TEMPERATURE: 98 F | HEART RATE: 71 BPM | OXYGEN SATURATION: 97 % | HEIGHT: 64 IN

## 2018-05-21 DIAGNOSIS — D50.0 IRON DEFICIENCY ANEMIA DUE TO CHRONIC BLOOD LOSS: Primary | ICD-10-CM

## 2018-05-21 PROCEDURE — 44378 SMALL BOWEL ENDOSCOPY: CPT | Performed by: INTERNAL MEDICINE

## 2018-05-21 PROCEDURE — 27202087 HC PROBE, APC: Performed by: INTERNAL MEDICINE

## 2018-05-21 PROCEDURE — 25000003 PHARM REV CODE 250: Performed by: NURSE ANESTHETIST, CERTIFIED REGISTERED

## 2018-05-21 PROCEDURE — 37000008 HC ANESTHESIA 1ST 15 MINUTES: Performed by: INTERNAL MEDICINE

## 2018-05-21 PROCEDURE — 25000003 PHARM REV CODE 250: Performed by: INTERNAL MEDICINE

## 2018-05-21 PROCEDURE — 36415 COLL VENOUS BLD VENIPUNCTURE: CPT

## 2018-05-21 PROCEDURE — 37000009 HC ANESTHESIA EA ADD 15 MINS: Performed by: INTERNAL MEDICINE

## 2018-05-21 PROCEDURE — 82247 BILIRUBIN TOTAL: CPT

## 2018-05-21 PROCEDURE — 27201238 HC BALLOON, OVERTUBE (ANY): Performed by: INTERNAL MEDICINE

## 2018-05-21 PROCEDURE — 63600175 PHARM REV CODE 636 W HCPCS: Performed by: NURSE ANESTHETIST, CERTIFIED REGISTERED

## 2018-05-21 RX ORDER — SODIUM CHLORIDE 9 MG/ML
INJECTION, SOLUTION INTRAVENOUS CONTINUOUS
Status: DISCONTINUED | OUTPATIENT
Start: 2018-05-21 | End: 2018-05-21 | Stop reason: HOSPADM

## 2018-05-21 RX ORDER — ETOMIDATE 2 MG/ML
INJECTION INTRAVENOUS
Status: DISCONTINUED | OUTPATIENT
Start: 2018-05-21 | End: 2018-05-21

## 2018-05-21 RX ORDER — SODIUM CHLORIDE 0.9 % (FLUSH) 0.9 %
3 SYRINGE (ML) INJECTION EVERY 8 HOURS
Status: DISCONTINUED | OUTPATIENT
Start: 2018-05-21 | End: 2018-05-21 | Stop reason: HOSPADM

## 2018-05-21 RX ORDER — FENTANYL CITRATE 50 UG/ML
INJECTION, SOLUTION INTRAMUSCULAR; INTRAVENOUS
Status: DISCONTINUED | OUTPATIENT
Start: 2018-05-21 | End: 2018-05-21

## 2018-05-21 RX ORDER — SUCCINYLCHOLINE CHLORIDE 20 MG/ML
INJECTION INTRAMUSCULAR; INTRAVENOUS
Status: DISCONTINUED | OUTPATIENT
Start: 2018-05-21 | End: 2018-05-21

## 2018-05-21 RX ORDER — LIDOCAINE HCL/PF 100 MG/5ML
SYRINGE (ML) INTRAVENOUS
Status: DISCONTINUED | OUTPATIENT
Start: 2018-05-21 | End: 2018-05-21

## 2018-05-21 RX ORDER — SODIUM CHLORIDE 0.9 % (FLUSH) 0.9 %
3 SYRINGE (ML) INJECTION
Status: DISCONTINUED | OUTPATIENT
Start: 2018-05-21 | End: 2018-05-21 | Stop reason: HOSPADM

## 2018-05-21 RX ADMIN — ETOMIDATE 10 MG: 2 INJECTION, SOLUTION INTRAVENOUS at 11:05

## 2018-05-21 RX ADMIN — LIDOCAINE HYDROCHLORIDE 75 MG: 20 INJECTION, SOLUTION INTRAVENOUS at 11:05

## 2018-05-21 RX ADMIN — FENTANYL CITRATE 50 MCG: 50 INJECTION, SOLUTION INTRAMUSCULAR; INTRAVENOUS at 11:05

## 2018-05-21 RX ADMIN — FENTANYL CITRATE 25 MCG: 50 INJECTION, SOLUTION INTRAMUSCULAR; INTRAVENOUS at 12:05

## 2018-05-21 RX ADMIN — FENTANYL CITRATE 25 MCG: 50 INJECTION, SOLUTION INTRAMUSCULAR; INTRAVENOUS at 11:05

## 2018-05-21 RX ADMIN — SODIUM CHLORIDE: 0.9 INJECTION, SOLUTION INTRAVENOUS at 09:05

## 2018-05-21 RX ADMIN — SUCCINYLCHOLINE CHLORIDE 100 MG: 20 INJECTION, SOLUTION INTRAMUSCULAR; INTRAVENOUS at 11:05

## 2018-05-21 NOTE — H&P
[facility]    CC: anemia    HPI 89 y.o. male with recurrent, severe anemia likely primarily secondary to GI blood loss anemia due to angioectasias of the small bowel and also due to CKD.  He was recently hospitalized with symptomatic anemia requiring blood transfusion.      Past Medical History:   Diagnosis Date    Anemia     Anemia of chronic disorder 11/17/2017    Anemia, chronic renal failure, stage 4 (severe) 11/17/2017    Arthritis     AVM (arteriovenous malformation)     CHF (congestive heart failure)     Colon polyps     Coronary artery disease     Encounter for blood transfusion     GI bleeding 11/17/2017    Heart murmur     High cholesterol     Hypertension     Iron deficiency anemia due to chronic blood loss 11/17/2017    Positive occult stool blood test 11/17/2017    Prostate cancer     Prostate CA    Ulcer          Review of Systems  General ROS: negative for chills, fever or weight loss  Cardiovascular ROS: no chest pain or dyspnea on exertion  Gastrointestinal ROS: no abdominal pain, change in bowel habits, or black/ bloody stools    Physical Examination  There were no vitals taken for this visit.  General appearance: alert, cooperative, no distress  HENT: Normocephalic, atraumatic, neck symmetrical, no nasal discharge   Lungs: clear to auscultation bilaterally, no dullness to percussion bilaterally  Heart: regular rate and rhythm without rub; no displacement of the PMI   Abdomen: soft, non-tender; bowel sounds normoactive; no organomegaly  Extremities: extremities symmetric; no clubbing, cyanosis, or edema  Neurologic: Alert and oriented X 3, normal strength, normal coordination and gait    Labs:  H/H 10.6/30.4  Plt 166      Assessment: The patient is a 89 yo man with recurrent, severe iron deficiency anemia secondary to GI blood loss anemia due to angioectasias and CKD. 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  A tattoo was placed at this site.    Plan:  Upper DBE with provocation today with pre procedure Plavix given 16 hours prior to his procedure  to re evaluate his small bowel for angioectasias with planned PAC ablation if found.    Patient of Dr. Oumar Ramirez.    Russell Bill MD   [address] [facility] [facility] [address], [address]   [place] [id number] [phone]

## 2018-05-21 NOTE — ANESTHESIA POSTPROCEDURE EVALUATION
"Anesthesia Post Evaluation    Patient: Parker Carrasco    Procedure(s) Performed: Procedure(s) (LRB):  SMALL BOWEL ENDOSCOPY-UPPER DBE (N/A)    Final Anesthesia Type: general  Patient location during evaluation: PACU  Patient participation: Yes- Able to Participate  Level of consciousness: awake  Post-procedure vital signs: reviewed and stable  Pain management: adequate  Airway patency: patent  PONV status at discharge: No PONV  Anesthetic complications: no      Cardiovascular status: stable  Respiratory status: unassisted and room air  Hydration status: euvolemic  Follow-up not needed.        Visit Vitals  BP (!) 147/68   Pulse 70   Temp 36.6 °C (97.8 °F) (Skin)   Resp 16   Ht 5' 4" (1.626 m)   Wt 63.5 kg (140 lb)   SpO2 96%   BMI 24.03 kg/m²       Pain/Светлана Score: Pain Assessment Performed: Yes (5/21/2018  1:14 PM)  Presence of Pain: denies (5/21/2018  1:14 PM)  Светлана Score: 10 (5/21/2018  1:14 PM)  Modified Светлана Score: 20 (5/21/2018  1:14 PM)      "

## 2018-05-21 NOTE — PLAN OF CARE
Problem: Patient Care Overview  Goal: Plan of Care Review  Outcome: Ongoing (interventions implemented as appropriate)  Patient to endo, vss, no issues will send to endo for discharge

## 2018-05-21 NOTE — ANESTHESIA RELEASE NOTE
"Anesthesia Release from PACU Note    Patient: Parker Carrasco    Procedure(s) Performed: Procedure(s) (LRB):  SMALL BOWEL ENDOSCOPY-UPPER DBE (N/A)    Anesthesia type: general    Post pain: Adequate analgesia    Post assessment: no apparent anesthetic complications    Last Vitals:   Visit Vitals  BP (!) 147/68   Pulse 70   Temp 36.6 °C (97.8 °F) (Skin)   Resp 16   Ht 5' 4" (1.626 m)   Wt 63.5 kg (140 lb)   SpO2 96%   BMI 24.03 kg/m²       Post vital signs: stable    Level of consciousness: awake, alert  and oriented    Nausea/Vomiting: no nausea/no vomiting    Complications: none    Airway Patency: patent    Respiratory: unassisted    Cardiovascular: stable    Hydration: euvolemic  "

## 2018-05-21 NOTE — PLAN OF CARE
Problem: Patient Care Overview  Goal: Plan of Care Review  Outcome: Ongoing (interventions implemented as appropriate)  Patient to endo family notified

## 2018-05-21 NOTE — TRANSFER OF CARE
"Anesthesia Transfer of Care Note    Patient: Parker Carrasco    Procedure(s) Performed: Procedure(s) (LRB):  SMALL BOWEL ENDOSCOPY-UPPER DBE (N/A)    Patient location: PACU    Anesthesia Type: general    Transport from OR: Transported from OR on room air with adequate spontaneous ventilation    Post pain: adequate analgesia    Post assessment: no apparent anesthetic complications and tolerated procedure well    Post vital signs: stable    Level of consciousness: awake, oriented and alert    Nausea/Vomiting: no nausea/vomiting    Complications: none          Last vitals:   Visit Vitals  BP (!) 179/79   Pulse 72   Temp 36.4 °C (97.5 °F) (Skin)   Resp 17   Ht 5' 4" (1.626 m)   Wt 63.5 kg (140 lb)   SpO2 98%   BMI 24.03 kg/m²     "

## 2018-05-21 NOTE — [employer]OVATION PATIENT INSTRUCTIONS
Discharge Summary/Instructions after an Endoscopic Procedure  Patient Name: Parker Carrasco  Patient MRN: [id number]  Patient YOB: 1925  Monday, May 21, 2018  Russell Bill MD  RESTRICTIONS:  During your procedure today, you received medications for sedation.  These   medications may affect your judgment, balance and coordination.  Therefore,   for 24 hours, you have the following restrictions:   - DO NOT drive a car, operate machinery, make legal/financial decisions,   sign important papers or drink alcohol.    ACTIVITY:  The following day: return to full activity including work, except no heavy   lifting, straining or running for 3 days if polyps were removed.  DIET:  Eat and drink normally unless instructed otherwise.     TREATMENT FOR COMMON SIDE EFFECTS:  - Mild abdominal pain, nausea, belching, bloating or excessive gas:  rest,   eat lightly and use a heating pad.  - Sore Throat: treat with throat lozenges and/or gargle with warm salt   water.  - Because air was used during the procedure, expelling large amounts of air   from your rectum or belching is normal.  - If a bowel prep was taken, you may not have a bowel movement for 1-3 days.    This is normal.  SYMPTOMS TO WATCH FOR AND REPORT TO YOUR PHYSICIAN:  1. Abdominal pain or bloating, other than gas cramps.  2. Chest pain.  3. Back pain.  4. Signs of infection such as: chills or fever occurring within 24 hours   after the procedure.  5. Rectal bleeding, which would show as bright red, maroon, or black stools.   (A tablespoon of blood from the rectum is not serious, especially if   hemorrhoids are present.)  6. Vomiting.  7. Weakness or dizziness.  GO DIRECTLY TO THE NEAREST EMERGENCY ROOM IF YOU HAVE ANY OF THE FOLLOWING:      Difficulty breathing              Chills and/or fever over 101 F   Persistent vomiting and/or vomiting blood   Severe abdominal pain   Severe chest pain   Black, tarry stools   Bleeding- more than one tablespoon   Any other  symptom or condition that you feel may need urgent attention  Your doctor recommends these additional instructions:  If any biopsies were taken, your doctors clinic will contact you in 1 to 2   weeks with any results.  Continue iron supplementation   Proceed with Procrit injections per hematology   Apply for Sandostatin LAR injections 20mg IM once per month   Reconsider TAVR procedure for treatment of severe AS as this intervention,   if suscessful, may improve his SOB as well as his GI blood loss if Heyde's   syndrome is present   Discharge to home   Condition stable   Resume previous diet   The signs and symptoms of potential delayed complications were discussed   with the patient. If signs or symptoms of these complications develop, call   the [facility] at [phone].   Return to normal activities tomorrow.  Written discharge instructions were   provided to the patient.  For questions, problems or results please call your physician - Russell Bill MD at Work:  [phone].  EMERGENCY PHONE NUMBER: [phone],  LAB RESULTS: [phone]  IF A COMPLICATION OR EMERGENCY SITUATION ARISES AND YOU ARE UNABLE TO REACH   YOUR PHYSICIAN - GO DIRECTLY TO THE EMERGENCY ROOM.  MD Russell Harrell MD  5/21/2018 12:56:13 PM  This report has been verified and signed electronically.  [employer]ATION

## 2018-05-21 NOTE — ANESTHESIA PREPROCEDURE EVALUATION
05/20/2018  Parker Carrasco is a 89 y.o., male w acute on chronic blood loss anemia; recent transfusion in [facility]; now for repeat upper balloon endoscopy.    PRIOR ANES (in Epic) 20180520 [id number] Upper_balloon_endoscopy [place]     [fent 50, etom 10] VSS     [sevo, phenyleph 100] NAAC     [easy mask; ETT Mac#3, Grade I]      ANES-RELATED MED/SURG  Patient Active Problem List   Diagnosis    Iron deficiency anemia due to chronic blood loss    Anemia, chronic renal failure, stage 4 (severe)    Positive occult stool blood test    GI bleeding    Anemia    Chronic renal impairment, stage 3 (moderate)    Mixed conductive and sensorineural hearing loss of both ears    Coronary artery disease    Elevated troponin    AVM (arteriovenous malformation) of colon    Acute blood loss anemia    Dehydration    Chronic systolic congestive heart failure    Anemia due to blood loss     Past Medical History:   Diagnosis Date    Anemia     Anemia of chronic disorder 11/17/2017    Anemia, chronic renal failure, stage 4 (severe) 11/17/2017    Arthritis     AVM (arteriovenous malformation)     CHF (congestive heart failure)     Colon polyps     Coronary artery disease     Encounter for blood transfusion     GI bleeding 11/17/2017    Heart murmur     High cholesterol     Hypertension     Iron deficiency anemia due to chronic blood loss 11/17/2017    Positive occult stool blood test 11/17/2017    Prostate cancer     Prostate CA    Ulcer      Past Surgical History:   Procedure Laterality Date    CARDIAC SURGERY      COLECTOMY      partial    COLECTOMY      COLONOSCOPY      CORONARY ARTERY BYPASS GRAFT      CORONARY ARTERY BYPASS GRAFT      ENUCLEATION      EYE SURGERY      HERNIA REPAIR       ALLERGIES  Review of patient's allergies indicates:  No Known Allergies    ANES-RELATED HOME Rx  [id number]  Amlodipine pantoprazole  Carvedilol  isosorbide  lovastatin    Pre-op Assessment         Review of Systems  Anesthesia Hx:  History of prior surgery of interest to airway management or planning:  Denies Personal Hx of Anesthesia complications.   Social:  Former Smoker, Social Alcohol Use    Hematology/Oncology:         -- Anemia: Hematology Comments: Blood transfusions  Current/Recent Cancer.   EENT/Dental:   Upper & lower dentures   Cardiovascular:   Hypertension CAD  CABG/stent  Aortic stenosis   Pulmonary:  Pulmonary Normal  Denies Sleep Apnea.    Renal/:   Chronic Renal Disease    Hepatic/GI:   Denies Liver Disease.    Musculoskeletal:  Denies Spine Disorders    Neurological:   Denies TIA. Denies CVA. Carotid stenosis   Endocrine:  Endocrine Normal      Wt Readings from Last 1 Encounters:   05/17/18 68.5 kg (151 lb)     Temp Readings from Last 1 Encounters:   05/19/18 36.4 °C (97.5 °F) (Oral)     BP Readings from Last 1 Encounters:   05/19/18 (!) 154/63     Pulse Readings from Last 1 Encounters:   05/19/18 62     SpO2 Readings from Last 1 Encounters:   05/19/18 97%       Physical Exam  General:  Well nourished     Eyes/Ears/Nose:  EYES/EARS/NOSE FINDINGS: Normal   Dental:  Dental Findings: Lower Dentures, Upper Dentures, Edentulous   Chest/Lungs:  Chest/Lungs Clear    Heart/Vascular:  Heart Findings: Normal Heart murmur: negative       Mental Status:  Mental Status Findings: Normal (bad hearing severely hampers communication)       Lab Results   Component Value Date    WBC 7.10 05/19/2018    HGB 10.6 (L) 05/19/2018    HCT 30.4 (L) 05/19/2018    MCV 94 05/19/2018     05/19/2018       Chemistry        Component Value Date/Time     (L) 05/19/2018 0346    K 3.4 (L) 05/19/2018 0346     (L) 05/19/2018 0346    CO2 28 05/19/2018 0346    BUN 54 (H) 05/19/2018 0346    CREATININE 1.5 (H) 05/19/2018 0346     05/19/2018 0346        Component Value Date/Time    CALCIUM 8.2 (L) 05/19/2018  0346    ALKPHOS 81 05/19/2018 0346    AST 20 05/19/2018 0346    ALT 6 (L) 05/19/2018 0346    BILITOT 0.9 05/19/2018 0346    ESTGFRAFRICA 46.1 (A) 05/19/2018 0346    EGFRNONAA 39.8 (A) 05/19/2018 0346          Lab Results   Component Value Date    ALBUMIN 3.6 05/19/2018      Lab Results   Component Value Date    TSH 3.66 04/29/2018    F2BFWKH 6.7 04/29/2018     Lab Results   Component Value Date    INR 1.1 03/19/2018    INR 1.1 02/18/2018     CXR [id number]  Haziness in the lungs, ¿small effusion?  Small patchy density left lung base.  Suggestive of interstitial pulmonary edema due to CHF    EKG [id number]  Sinus rhythm with 1st degree A-V block with occasional Premature  ventricular complexes  Nonspecific ST and T wave abnormality  Abnormal ECG  When compared with ECG of 15-APR-2018 06:29,  Premature ventricular complexes are now Present  Confirmed by RICH    ECHO [id number]   1 - Severe left atrial enlargement.    2 - Eccentric hypertrophy.    3 - No wall motion abnormalities.    4 - Low nl to mildly depressed LV systolic funct        (EF 50-55%).    5 - Normal right ventricular systolic function .    6 - The estimated PA systolic pressure is 40 mmHg.    7 - Trivial aortic regurgitation.    8 - Mild mitral regurgitation.    9 - Mild tricuspid regurgitation.   10 - Moderately severe aortic stenosis .       Anesthesia Plan  Type of Anesthesia, risks & benefits discussed:  Anesthesia Type:  MAC, general  Patient's Preference:   Intra-op Monitoring Plan:   Intra-op Monitoring Plan Comments:   Post Op Pain Control Plan:   Post Op Pain Control Plan Comments:   Induction:   IV  Beta Blocker:  Patient is on a Beta-Blocker and has not received dose within the past 24 hours due to non-compliance or for other reasons (Patient should receive a perioperative dose or document why it is withheld).     Beta Blocker Comments: [id number]   - hard of hearing   - recent transfusion & furosemide; now back to normal health status   - niece  signed consent because pt has difficulty writing; he has no questions about anesthesia  Informed Consent: Patient representative understands risks and agrees with Anesthesia plan.  Questions answered. Anesthesia consent signed with patient representative.  ASA Score: 4     Day of Surgery Review of History & Physical:        Anesthesia Plan Notes: [id number]   - pt hard of hearing and seems to be uncertain about aspects of his medical history; spoke to niece on phone   - pt lives al[place] & takes his one medications   - dentures are in   - no Rx today, may need beta blocker if tachycardia   - mod severe AS (etomidate last time well-tolerated)        Ready For Surgery From Anesthesia Perspective.

## 2018-05-21 NOTE — TRANSFER OF CARE
"Anesthesia Transfer of Care Note    Patient: Parker Carrasco    Procedure(s) Performed: Procedure(s) (LRB):  SMALL BOWEL ENDOSCOPY-UPPER DBE (N/A)    Patient location: GI    Anesthesia Type: MAC    Transport from OR: Transported from OR on room air with adequate spontaneous ventilation    Post pain: adequate analgesia    Post assessment: no apparent anesthetic complications    Post vital signs: stable    Level of consciousness: awake    Nausea/Vomiting: no nausea/vomiting    Complications: none          Last vitals:   Visit Vitals  BP (!) 162/71 (BP Location: Left arm)   Pulse 61   Temp 36.7 °C (98.1 °F) (Oral)   Resp 16   Ht 5' 4" (1.626 m)   Wt 63.5 kg (140 lb)   SpO2 100%   BMI 24.03 kg/m²     "

## 2018-05-22 ENCOUNTER — TELEPHONE (OUTPATIENT)
Dept: PRIMARY CARE CLINIC | Facility: CLINIC | Age: 83
End: 2018-05-22

## 2018-05-22 NOTE — TELEPHONE ENCOUNTER
Spoke with Sinan, states that patient has been in and out of the hospital and was actually discharge from hospital yesterday. They did not receive any orders stating that they are to resume home health weekly and draw CBC once a week. Notified her that Dr. Langston will not be in until later today and he would need to check into patients chart to see what was done at the hospital in order to write orders and notified Sinan that if he would like for the patient to continue HH then I will go ahead and fax over an order to them. However, if he denies patient to continue home health then I will personally contact Sinan and let her know...    States understanding and I will speak with Dr Langston as soon has he comes into the office to see what he would like the patient to do.        Fax# [phone]

## 2018-05-22 NOTE — TELEPHONE ENCOUNTER
----- Message from Gabriela Monae sent at 5/22/2018 10:59 AM CDT -----  Contact: [facility] states need to speak to the nurse about pt post hospital orders....[phone]

## 2018-05-23 ENCOUNTER — TELEPHONE (OUTPATIENT)
Dept: PRIMARY CARE CLINIC | Facility: CLINIC | Age: 83
End: 2018-05-23

## 2018-05-23 ENCOUNTER — TELEPHONE (OUTPATIENT)
Dept: NEUROLOGY | Facility: HOSPITAL | Age: 83
End: 2018-05-23

## 2018-05-23 RX ORDER — ISOSORBIDE DINITRATE 30 MG/1
TABLET ORAL
Qty: 60 TABLET | Refills: 5 | Status: SHIPPED | OUTPATIENT
Start: 2018-05-23 | End: 2018-12-06 | Stop reason: SDUPTHER

## 2018-05-23 NOTE — TELEPHONE ENCOUNTER
----- Message from Russell Bill MD sent at 5/23/2018  1:58 PM CDT -----  Please apply for Sandostatin injections 20mg once per month

## 2018-05-23 NOTE — TELEPHONE ENCOUNTER
----- Message from Elsa Cantu sent at 5/23/2018  9:32 AM CDT -----  Contact: Sinan with [facility]  Sinan is calling to touch base to see if the nurse had a chance to speak with the doctor regarding the conversation yesterday.  Call Back#[phone]  Thanks

## 2018-05-24 ENCOUNTER — TELEPHONE (OUTPATIENT)
Dept: NEUROLOGY | Facility: HOSPITAL | Age: 83
End: 2018-05-24

## 2018-05-24 DIAGNOSIS — D50.0 IRON DEFICIENCY ANEMIA SECONDARY TO BLOOD LOSS (CHRONIC): Primary | ICD-10-CM

## 2018-05-24 LAB
A2 MACROGLOB SERPL-MCNC: NORMAL
ALT SERPL W P-5'-P-CCNC: NORMAL U/L
APO A-I SERPL-MCNC: NORMAL
BILIRUB SERPL-MCNC: NORMAL MG/DL
BIOPREDICTIVE SERIAL NUMBER: NORMAL
FIBROSIS STAGE SERPL QL: NORMAL
FIBROTEST INTERPRETATION: NORMAL
FIBROTEST-ACTITEST COMMENT: NORMAL
GGT SERPL-CCNC: NORMAL U/L
HAPTOGLOB SERPL-MCNC: NORMAL MG/DL
LIVER FIBR SCORE SERPL CALC.FIBROSURE: NORMAL
NECROINFLAMMAT INTERP: NORMAL
NECROINFLAMMATORY ACT GRADE SERPL QL: NORMAL
NECROINFLAMMATORY ACT SCORE SERPL: NORMAL

## 2018-05-24 NOTE — TELEPHONE ENCOUNTER
kylie Gaona, notified, lab order placed in EPIC.  Candelaria will go to nearest lab to have drawn.

## 2018-05-25 ENCOUNTER — TELEPHONE (OUTPATIENT)
Dept: NEUROLOGY | Facility: HOSPITAL | Age: 83
End: 2018-05-25

## 2018-05-25 ENCOUNTER — TELEPHONE (OUTPATIENT)
Dept: INFUSION THERAPY | Facility: HOSPITAL | Age: 83
End: 2018-05-25

## 2018-05-25 NOTE — TELEPHONE ENCOUNTER
----- Message from Bertha Brown LPN sent at 5/23/2018  2:14 PM CDT -----  Sandostatin 20mg once per month for 6 months.  DX-D50.0.    Thanks.

## 2018-05-25 NOTE — TELEPHONE ENCOUNTER
Per Abdifatah at [facility], No auth needed for Ilene.  Ref# [id number].  Thanks  abc

## 2018-05-30 ENCOUNTER — TELEPHONE (OUTPATIENT)
Dept: INFUSION THERAPY | Facility: HOSPITAL | Age: 83
End: 2018-05-30

## 2018-06-04 ENCOUNTER — TELEPHONE (OUTPATIENT)
Dept: ADMINISTRATIVE | Facility: CLINIC | Age: 83
End: 2018-06-04

## 2018-06-06 ENCOUNTER — TELEPHONE (OUTPATIENT)
Dept: INFUSION THERAPY | Facility: HOSPITAL | Age: 83
End: 2018-06-06

## 2018-06-06 NOTE — TELEPHONE ENCOUNTER
Rd attempt to reach pt to schedule Ilene. Someone picked up phone and then hung up. Dr. Bill nurse, Bertha notified.

## 2018-06-08 ENCOUNTER — TELEPHONE (OUTPATIENT)
Dept: PRIMARY CARE CLINIC | Facility: CLINIC | Age: 83
End: 2018-06-08

## 2018-06-08 NOTE — TELEPHONE ENCOUNTER
----- Message from Bipin Duarte sent at 6/8/2018  2:11 PM CDT -----  Contact: Candelaria  Type:  Test Results    Who Called:  Candelaria caregiver patient's niece  Name of Test (Labs):    Date of Test:    Ordering Provider:    Where the test was performed:  [facility]  Best Call Back Number:  [phone]  Additional Information:  Requesting a call back

## 2018-06-08 NOTE — TELEPHONE ENCOUNTER
Spoke with caregiver states that  zach some blood   ( CBC ) recently and was waiting for a call back in regards to results. Notified that I will look for results and give them to Dr. Langston      States understanding

## 2018-06-19 ENCOUNTER — TELEPHONE (OUTPATIENT)
Dept: PRIMARY CARE CLINIC | Facility: CLINIC | Age: 83
End: 2018-06-19

## 2018-06-19 ENCOUNTER — INFUSION (OUTPATIENT)
Dept: INFUSION THERAPY | Facility: HOSPITAL | Age: 83
End: 2018-06-19
Attending: INTERNAL MEDICINE
Payer: MEDICARE

## 2018-06-19 VITALS — BODY MASS INDEX: 23.9 KG/M2 | HEIGHT: 64 IN | WEIGHT: 140 LBS

## 2018-06-19 DIAGNOSIS — R19.5 POSITIVE OCCULT STOOL BLOOD TEST: ICD-10-CM

## 2018-06-19 DIAGNOSIS — K92.2 GASTROINTESTINAL HEMORRHAGE, UNSPECIFIED GASTROINTESTINAL HEMORRHAGE TYPE: ICD-10-CM

## 2018-06-19 DIAGNOSIS — D64.9 ANEMIA, UNSPECIFIED TYPE: ICD-10-CM

## 2018-06-19 DIAGNOSIS — D50.0 IRON DEFICIENCY ANEMIA DUE TO CHRONIC BLOOD LOSS: Primary | ICD-10-CM

## 2018-06-19 PROCEDURE — 63600175 PHARM REV CODE 636 W HCPCS: Mod: JG | Performed by: INTERNAL MEDICINE

## 2018-06-19 PROCEDURE — 96372 THER/PROPH/DIAG INJ SC/IM: CPT

## 2018-06-19 RX ORDER — SODIUM CHLORIDE 9 MG/ML
INJECTION, SOLUTION INTRAVENOUS CONTINUOUS
Status: CANCELLED | OUTPATIENT
Start: 2018-06-19

## 2018-06-19 RX ORDER — SODIUM CHLORIDE 0.9 % (FLUSH) 0.9 %
10 SYRINGE (ML) INJECTION
Status: CANCELLED | OUTPATIENT
Start: 2018-06-19

## 2018-06-19 RX ORDER — HEPARIN 100 UNIT/ML
5 SYRINGE INTRAVENOUS
Status: CANCELLED | OUTPATIENT
Start: 2018-06-19

## 2018-06-19 RX ADMIN — OCTREOTIDE ACETATE 20 MG: KIT at 11:06

## 2018-06-19 NOTE — TELEPHONE ENCOUNTER
----- Message from Elsa Cantu sent at 6/19/2018 12:16 PM CDT -----  Contact: Patient's niece, Candelaria Gaona spoke with [facility] and they draw a CBC every week on the patient and they are faxing the results over. She stated that she has not been getting the results from your office and she needs these.  Call Back#[phone]  Thanks

## 2018-06-19 NOTE — TELEPHONE ENCOUNTER
Per Dr. Langston CBC looks good, no need for transfusion. Will see what CBC looks like next week when drawn again.      Spoke with daughter and states understanding

## 2018-06-19 NOTE — NURSING
Pt tolerated araseli injection well. Pt had no complaints. Appt made for 4 weeks. Reviewed possible side effects with niece and patient. Pt is [place] so niece reexplained to him. Niece verbalized understanding.

## 2018-06-20 ENCOUNTER — TELEPHONE (OUTPATIENT)
Dept: PRIMARY CARE CLINIC | Facility: CLINIC | Age: 83
End: 2018-06-20

## 2018-06-20 PROBLEM — I50.9 CONGESTIVE HEART FAILURE: Status: ACTIVE | Noted: 2018-06-20

## 2018-06-20 NOTE — TELEPHONE ENCOUNTER
----- Message from Anne Dougherty sent at 6/20/2018  2:09 PM CDT -----  Type: Needs Medical Advice    Who Called:  Thais with [facility]  Best Call Back Number: [phone]  Additional Information: Stated patient has crackling in lungs and weight gain, please call back to advise.

## 2018-06-21 PROBLEM — Z71.3 ENCOUNTER FOR DIETARY CONSULTATION: Chronic | Status: ACTIVE | Noted: 2018-06-21

## 2018-06-23 PROBLEM — I50.43 ACUTE ON CHRONIC COMBINED SYSTOLIC AND DIASTOLIC CONGESTIVE HEART FAILURE: Status: ACTIVE | Noted: 2018-06-20

## 2018-06-28 RX ORDER — GABAPENTIN 300 MG/1
300 CAPSULE ORAL 2 TIMES DAILY
Qty: 60 CAPSULE | Refills: 0 | Status: SHIPPED | OUTPATIENT
Start: 2018-06-28 | End: 2018-08-30 | Stop reason: SDUPTHER

## 2018-07-17 ENCOUNTER — INFUSION (OUTPATIENT)
Dept: INFUSION THERAPY | Facility: HOSPITAL | Age: 83
End: 2018-07-17
Attending: INTERNAL MEDICINE
Payer: MEDICARE

## 2018-07-17 VITALS — BODY MASS INDEX: 25.99 KG/M2 | HEIGHT: 65 IN | WEIGHT: 156 LBS

## 2018-07-17 DIAGNOSIS — R19.5 POSITIVE OCCULT STOOL BLOOD TEST: ICD-10-CM

## 2018-07-17 DIAGNOSIS — D64.9 ANEMIA, UNSPECIFIED TYPE: ICD-10-CM

## 2018-07-17 DIAGNOSIS — K92.2 GASTROINTESTINAL HEMORRHAGE, UNSPECIFIED GASTROINTESTINAL HEMORRHAGE TYPE: ICD-10-CM

## 2018-07-17 DIAGNOSIS — D50.0 IRON DEFICIENCY ANEMIA DUE TO CHRONIC BLOOD LOSS: Primary | ICD-10-CM

## 2018-07-17 PROCEDURE — 63600175 PHARM REV CODE 636 W HCPCS: Mod: JG | Performed by: INTERNAL MEDICINE

## 2018-07-17 PROCEDURE — 96372 THER/PROPH/DIAG INJ SC/IM: CPT

## 2018-07-17 RX ORDER — HEPARIN 100 UNIT/ML
5 SYRINGE INTRAVENOUS
Status: CANCELLED | OUTPATIENT
Start: 2018-07-17

## 2018-07-17 RX ORDER — SODIUM CHLORIDE 0.9 % (FLUSH) 0.9 %
10 SYRINGE (ML) INJECTION
Status: CANCELLED | OUTPATIENT
Start: 2018-07-17

## 2018-07-17 RX ORDER — CARBIDOPA AND LEVODOPA 10; 100 MG/1; MG/1
TABLET ORAL
Qty: 60 TABLET | Refills: 5 | Status: SHIPPED | OUTPATIENT
Start: 2018-07-17 | End: 2018-12-21 | Stop reason: SDUPTHER

## 2018-07-17 RX ORDER — SODIUM CHLORIDE 9 MG/ML
INJECTION, SOLUTION INTRAVENOUS CONTINUOUS
Status: CANCELLED | OUTPATIENT
Start: 2018-07-17

## 2018-07-17 RX ADMIN — OCTREOTIDE ACETATE 20 MG: KIT at 12:07

## 2018-07-31 PROBLEM — R00.1 BRADYCARDIA WITH LESS THAN 30 BEATS PER MINUTE: Status: ACTIVE | Noted: 2018-07-31

## 2018-07-31 PROBLEM — E87.1 HYPONATREMIA: Status: ACTIVE | Noted: 2018-07-31

## 2018-07-31 PROBLEM — I50.9 ACUTE ON CHRONIC CONGESTIVE HEART FAILURE: Status: ACTIVE | Noted: 2018-07-31

## 2018-08-01 PROBLEM — R53.81 DEBILITATED PATIENT: Status: ACTIVE | Noted: 2018-08-01

## 2018-08-07 ENCOUNTER — TELEPHONE (OUTPATIENT)
Dept: ADMINISTRATIVE | Facility: CLINIC | Age: 83
End: 2018-08-07

## 2018-08-07 NOTE — TELEPHONE ENCOUNTER
Home Health SOC 08/03/2018 to 10/01/2018 with  [facility] , Dr Rickey Langston,  service.

## 2018-08-09 PROBLEM — R06.02 SHORTNESS OF BREATH: Status: ACTIVE | Noted: 2018-08-09

## 2018-08-09 PROBLEM — Q27.30 AVM (ARTERIOVENOUS MALFORMATION): Status: ACTIVE | Noted: 2018-08-09

## 2018-08-13 ENCOUNTER — OFFICE VISIT (OUTPATIENT)
Dept: HEMATOLOGY/ONCOLOGY | Facility: CLINIC | Age: 83
End: 2018-08-13
Payer: MEDICARE

## 2018-08-13 VITALS
HEART RATE: 73 BPM | SYSTOLIC BLOOD PRESSURE: 150 MMHG | DIASTOLIC BLOOD PRESSURE: 84 MMHG | RESPIRATION RATE: 18 BRPM | TEMPERATURE: 98 F | WEIGHT: 132 LBS | BODY MASS INDEX: 22.66 KG/M2

## 2018-08-13 DIAGNOSIS — D50.0 ANEMIA DUE TO BLOOD LOSS: ICD-10-CM

## 2018-08-13 DIAGNOSIS — D50.0 IRON DEFICIENCY ANEMIA DUE TO CHRONIC BLOOD LOSS: Primary | ICD-10-CM

## 2018-08-13 DIAGNOSIS — N18.4 ANEMIA, CHRONIC RENAL FAILURE, STAGE 4 (SEVERE): ICD-10-CM

## 2018-08-13 DIAGNOSIS — D64.9 ANEMIA, UNSPECIFIED TYPE: ICD-10-CM

## 2018-08-13 DIAGNOSIS — R19.5 POSITIVE OCCULT STOOL BLOOD TEST: ICD-10-CM

## 2018-08-13 DIAGNOSIS — K92.2 GASTROINTESTINAL HEMORRHAGE, UNSPECIFIED GASTROINTESTINAL HEMORRHAGE TYPE: ICD-10-CM

## 2018-08-13 DIAGNOSIS — D63.1 ANEMIA, CHRONIC RENAL FAILURE, STAGE 4 (SEVERE): ICD-10-CM

## 2018-08-13 DIAGNOSIS — K55.20 AVM (ARTERIOVENOUS MALFORMATION) OF COLON: ICD-10-CM

## 2018-08-13 PROCEDURE — 99213 OFFICE O/P EST LOW 20 MIN: CPT | Mod: ,,, | Performed by: INTERNAL MEDICINE

## 2018-08-13 PROCEDURE — 1111F DSCHRG MED/CURRENT MED MERGE: CPT | Mod: ,,, | Performed by: INTERNAL MEDICINE

## 2018-08-13 NOTE — PROGRESS NOTES
[facility] Hematology/Oncology  PROGRESS NOTE      Subjective:       Patient ID:   NAME: Parker Carrasco : 1925     89 y.o. male    Referring Doc: Kian  Other Physicians: Codey Ramirez Daniel Raines    Chief Complaint:  Anemia f/u    History of Present Illness:     Patient returns today for a regularly scheduled follow-up visit.  He was recently hospitalized at [facility] with recurrent GI bleeding and required blood transfusions. They also started him on a proton-pump inhibitor. He was also admitted previously for CHF. He is here with his daughter. He is ambulating with use of walker. He denies any currently CP, SOB, HA's or N/V. He is on sandostatin injections.         ROS:   GEN: normal without any fever, night sweats or weight loss  HEENT: normal with no HA's, sore throat, stiff neck, changes in vision  CV: normal with no CP, SOB, PND, YORK or orthopnea  PULM: normal with no SOB, cough, hemoptysis, sputum or pleuritic pain  GI: normal with no abdominal pain, nausea, vomiting, constipation, diarrhea, melanotic stools, BRBPR, or hematemesis  : normal with no hematuria, dysuria  BREAST: normal with no mass, discharge, pain  SKIN: normal with no rash, erythema, bruising, or swelling    Allergies:  Review of patient's allergies indicates:  No Known Allergies    Medications:    Current Outpatient Medications:     albuterol-ipratropium 2.5mg-0.5mg/3mL (DUO-NEB) 0.5 mg-3 mg(2.5 mg base)/3 mL nebulizer solution, Take 3 mLs by nebulization every 6 (six) hours as needed for Wheezing. Rescue, Disp: 1 Box, Rfl: 0    amLODIPine (NORVASC) 2.5 MG tablet, , Disp: , Rfl:     aspirin (ECOTRIN) 81 MG EC tablet, Take 81 mg by mouth once daily., Disp: , Rfl:     carbidopa-levodopa  mg (SINEMET)  mg per tablet, ONE TABLET BY MOUTH TWICE DAILY 30 days FOR PARKINSON'S, Disp: 60 tablet, Rfl: 5    ferrous sulfate 325 (65 FE) MG EC tablet, Take 1 tablet (325 mg total) by mouth 2 (two) times daily with meals., Disp: , Rfl:  0    finasteride (PROSCAR) 5 mg tablet, Take 1 tablet (5 mg total) by mouth once daily., Disp: 30 tablet, Rfl: 11    gabapentin (NEURONTIN) 300 MG capsule, Take 1 capsule (300 mg total) by mouth 2 (two) times daily., Disp: 60 capsule, Rfl: 0    isosorbide dinitrate (ISORDIL) 30 MG Tab, TAKE ONE TABLET BY MOUTH TWICE DAILY, Disp: 60 tablet, Rfl: 5    lisinopril (PRINIVIL,ZESTRIL) 2.5 MG tablet, Take 1 tablet (2.5 mg total) by mouth once daily., Disp: 90 tablet, Rfl: 3    lovastatin (MEVACOR) 20 MG tablet, Take 1 tablet (20 mg total) by mouth every evening., Disp: 90 tablet, Rfl: 3    metoprolol succinate (TOPROL-XL) 50 MG 24 hr tablet, Take 1 tablet (50 mg total) by mouth once daily., Disp: 30 tablet, Rfl: 11    nitroGLYCERIN (NITROSTAT) 0.4 MG SL tablet, 1 tablet SL q 5 minutes x 3 prn, Disp: 25 tablet, Rfl: 2    octreotide lar (SANDOSTATIN LAR) 20 mg injection, Inject 20 mg into the muscle every 28 days., Disp: , Rfl:     pantoprazole (PROTONIX) 40 MG tablet, Take 1 tablet (40 mg total) by mouth once daily., Disp: 90 tablet, Rfl: 3    paroxetine (PAXIL) 20 MG tablet, Take 1 tablet (20 mg total) by mouth once daily., Disp: 30 tablet, Rfl: 5    ramelteon (ROZEREM) 8 mg tablet, Take 1 tablet (8 mg total) by mouth nightly as needed for Insomnia., Disp: 20 tablet, Rfl: 0    tamsulosin (FLOMAX) 0.4 mg Cp24, TAKE ONE CAPSULE BY MOUTH EVERY DAY, Disp: 30 capsule, Rfl: 5    traMADol (ULTRAM) 50 mg tablet, Take 1 tablet (50 mg total) by mouth every 12 (twelve) hours as needed for Pain., Disp: 60 tablet, Rfl: 0    PMHx/PSHx Updates:  See patient's last visit with me on  4/23/2018.  See H&P on 11/17/2017        Pathology:  none          Objective:     Vitals:  Blood pressure (!) 150/84, pulse 73, temperature 97.6 °F (36.4 °C), resp. rate 18, weight 59.9 kg (132 lb).    Physical Examination:   GEN: no apparent distress, comfortable; AAOx3  HEAD: atraumatic and normocephalic  EYES: no pallor, no icterus, PERRLA;  left enucleated  ENT: OMM, no pharyngeal erythema, external ears WNL; no nasal discharge; no thrush  NECK: no masses, thyroid normal, trachea midline, no LAD/LN's, supple  CV: RRR with no murmur; normal pulse; normal S1 and S2; no pedal edema  CHEST: Normal respiratory effort; CTAB; normal breath sounds; no wheeze or crackles  ABDOM: nontender and nondistended; soft; normal bowel sounds; no rebound/guarding  MUSC/Skeletal: ROM normal; no crepitus; joints normal; no deformities or arthropathy; uses walker  EXTREM: no clubbing, cyanosis, inflammation or swelling  SKIN: no rashes, lesions, ulcers, petechiae or subcutaneous nodules  : no brand  NEURO: grossly intact; motor/sensory WNL; AAOx3; no tremors  PSYCH: normal mood, affect and behavior  LYMPH: normal cervical, supraclavicular, axillary and groin LN's            Labs:   8/10/2018  Lab Results   Component Value Date    WBC 8.20 08/10/2018    HGB 10.6 (L) 08/10/2018    HCT 31.3 (L) 08/10/2018    MCV 95 08/10/2018     08/10/2018     BMP  Lab Results   Component Value Date     (L) 08/09/2018    K 4.3 08/09/2018     08/09/2018    CO2 21 (L) 08/09/2018    BUN 59 (H) 08/09/2018    CREATININE 1.9 (H) 08/09/2018    CALCIUM 8.5 (L) 08/09/2018    ANIONGAP 7 (L) 08/09/2018    ESTGFRAFRICA 34.6 (A) 08/09/2018    EGFRNONAA 29.9 (A) 08/09/2018     Lab Results   Component Value Date    ALT 9 (L) 08/09/2018    AST 18 08/09/2018    GGT Test Not Performed 05/21/2018    ALKPHOS 87 08/09/2018    BILITOT 0.8 08/09/2018 7/25/2018  Lab Results   Component Value Date    IRON 41 (L) 07/25/2018    TIBC 308 07/25/2018    FERRITIN 41 07/25/2018       Radiology/Diagnostic Studies:    No results found.    I have reviewed all available lab results and radiology reports.    Assessment/Plan:   (1) 89 y.o. male with diagnosis of transfusion dependant anemia who was seen as a consult at [facility] in Oct 2017 by my associate Dr CHARLY Morrison  - [facility] parameters  -  multifactorial etiology with chronic GIB and anemia of chronic renal disease  - iron deficiency from the chronic GIB with OBS positive on multiple tests  - ferritin is up to 464 and better; iron better at 339  - he is followed by Dr Ramirez with GI and had colonoscopy in July 2017  - total bili was wnl so I do not suspect any underlying hemolysis  - s/p endoscopy with Dr Russell Bill at [place] and had cauterized bleeding spots in the jejunum  - he is on sandostatin per Dr Bill direction  - he was recently re-hospitalized at [facility] with recurrent GIB and required blood transfusion  - latest hgb at 10.1  - iron is currently 41     (2) CAD s/p CABG in past; CHF and paroxysmal atrial fib - followed by Dr Alegre in [facility] and Dr Oliveira in [facility]     (3) Aortic valve stenosis     (4) BPH with elevated PSA     (5) Hx/of right partial colectomy and stomach ulcers (PUD)     (6) HTN and macular degeneration     (7) Chronic COPD/emphysema and pulmonary HTN; former smoker     (8) CRI - he does not have a kidney specialist     1. Iron deficiency anemia due to chronic blood loss     2. Anemia, chronic renal failure, stage 4 (severe)     3. Anemia due to blood loss     4. Anemia, unspecified type     5. Gastrointestinal hemorrhage, unspecified gastrointestinal hemorrhage type     6. AVM (arteriovenous malformation) of colon     7. Positive occult stool blood test           PLAN:  1. F/u with GI, PCP, etc  2.  Check labs monthly for now and set up the IV iron again  3. He is not candidate for procrit  4. F/u with GI, card and PCP    RTC in  3 months  Fax note to James Langston Hickey, Raines    Discussion:     I have explained all of the above in detail and the patient understands all of the current recommendation(s). I have answered all of their questions to the best of my ability and to their complete satisfaction.   The patient is to continue with the current management plan.            Electronically signed by Jozef PARKS  MD Omari

## 2018-08-16 RX ORDER — PAROXETINE HYDROCHLORIDE 20 MG/1
TABLET, FILM COATED ORAL
Qty: 30 TABLET | Refills: 5 | Status: SHIPPED | OUTPATIENT
Start: 2018-08-16 | End: 2019-01-16 | Stop reason: SDUPTHER

## 2018-08-22 ENCOUNTER — INFUSION (OUTPATIENT)
Dept: INFUSION THERAPY | Facility: HOSPITAL | Age: 83
End: 2018-08-22
Attending: INTERNAL MEDICINE
Payer: MEDICARE

## 2018-08-22 VITALS — WEIGHT: 132 LBS | HEIGHT: 64 IN | BODY MASS INDEX: 22.53 KG/M2

## 2018-08-22 DIAGNOSIS — D50.0 IRON DEFICIENCY ANEMIA DUE TO CHRONIC BLOOD LOSS: Primary | ICD-10-CM

## 2018-08-22 DIAGNOSIS — K92.2 GASTROINTESTINAL HEMORRHAGE, UNSPECIFIED GASTROINTESTINAL HEMORRHAGE TYPE: ICD-10-CM

## 2018-08-22 DIAGNOSIS — R19.5 POSITIVE OCCULT STOOL BLOOD TEST: ICD-10-CM

## 2018-08-22 DIAGNOSIS — D64.9 ANEMIA, UNSPECIFIED TYPE: ICD-10-CM

## 2018-08-22 PROCEDURE — 96372 THER/PROPH/DIAG INJ SC/IM: CPT

## 2018-08-22 PROCEDURE — 63600175 PHARM REV CODE 636 W HCPCS: Mod: JG | Performed by: INTERNAL MEDICINE

## 2018-08-22 RX ORDER — SODIUM CHLORIDE 0.9 % (FLUSH) 0.9 %
10 SYRINGE (ML) INJECTION
Status: CANCELLED | OUTPATIENT
Start: 2018-08-22

## 2018-08-22 RX ORDER — SODIUM CHLORIDE 9 MG/ML
INJECTION, SOLUTION INTRAVENOUS CONTINUOUS
Status: CANCELLED | OUTPATIENT
Start: 2018-08-22

## 2018-08-22 RX ORDER — HEPARIN 100 UNIT/ML
5 SYRINGE INTRAVENOUS
Status: CANCELLED | OUTPATIENT
Start: 2018-08-22

## 2018-08-22 RX ADMIN — OCTREOTIDE ACETATE 20 MG: KIT at 11:08

## 2018-08-22 NOTE — NURSING
Spoke with Dr. Fly Stone, nurse as pt's cardiologist wants to know if it's ok for pt to be on Aspirin or Plavix with his bleeding issues, and pt would like to see if he can receive his injections closer to home as transportation is an issue for him. Bertha will contact pt with responses. If he cannot receive his injections closer to home, he will continue here.

## 2018-08-30 RX ORDER — GABAPENTIN 300 MG/1
300 CAPSULE ORAL 2 TIMES DAILY
Qty: 60 CAPSULE | Refills: 0 | Status: SHIPPED | OUTPATIENT
Start: 2018-08-30 | End: 2018-10-09 | Stop reason: SDUPTHER

## 2018-09-07 ENCOUNTER — TELEPHONE (OUTPATIENT)
Dept: NEUROLOGY | Facility: HOSPITAL | Age: 83
End: 2018-09-07

## 2018-09-07 NOTE — TELEPHONE ENCOUNTER
----- Message from Marina Krishna sent at 9/7/2018 11:02 AM CDT -----  Contact: Candelaria Rodriguez  GI- Patients Candelaria espinal is calling to see if the Sandostatin injection could be sent to the [facility] for home health to administer. Call Candelaria at [phone].

## 2018-09-07 NOTE — TELEPHONE ENCOUNTER
Phoned kylie Gaona and discussed issue.  Emailed Oumar Wu for advice with this matter and will notify patient and Candelaria with answers.

## 2018-09-10 ENCOUNTER — TELEPHONE (OUTPATIENT)
Dept: NEUROLOGY | Facility: HOSPITAL | Age: 83
End: 2018-09-10

## 2018-09-10 RX ORDER — TRAMADOL HYDROCHLORIDE 50 MG/1
TABLET ORAL
Qty: 30 TABLET | Refills: 0 | Status: SHIPPED | OUTPATIENT
Start: 2018-09-10 | End: 2018-09-11 | Stop reason: SDUPTHER

## 2018-09-10 NOTE — TELEPHONE ENCOUNTER
Phoned ABHINAV with [employer] to see is something could be done to get patient's sandostatin delivered at home for the H H nurse to administer.  Beverly said that there is nothing available, only if she had commercial insurance.  Phoned Candelaria, patient's niece and LVM to return call or to call his insurance company to see if there were any other alternatives.

## 2018-09-11 ENCOUNTER — OFFICE VISIT (OUTPATIENT)
Dept: PRIMARY CARE CLINIC | Facility: CLINIC | Age: 83
End: 2018-09-11
Payer: MEDICARE

## 2018-09-11 VITALS
BODY MASS INDEX: 24.41 KG/M2 | WEIGHT: 143 LBS | SYSTOLIC BLOOD PRESSURE: 119 MMHG | OXYGEN SATURATION: 97 % | HEART RATE: 74 BPM | DIASTOLIC BLOOD PRESSURE: 61 MMHG | RESPIRATION RATE: 18 BRPM | HEIGHT: 64 IN

## 2018-09-11 DIAGNOSIS — M54.50 ACUTE LEFT-SIDED LOW BACK PAIN WITHOUT SCIATICA: ICD-10-CM

## 2018-09-11 DIAGNOSIS — D50.0 IRON DEFICIENCY ANEMIA DUE TO CHRONIC BLOOD LOSS: ICD-10-CM
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
## 2018-09-11 DIAGNOSIS — H90.6 MIXED CONDUCTIVE AND SENSORINEURAL HEARING LOSS OF BOTH EARS: ICD-10-CM

## 2018-09-11 DIAGNOSIS — I50.22 CHRONIC SYSTOLIC CONGESTIVE HEART FAILURE: ICD-10-CM

## 2018-09-11 DIAGNOSIS — N18.30 CHRONIC RENAL IMPAIRMENT, STAGE 3 (MODERATE): ICD-10-CM

## 2018-09-11 DIAGNOSIS — I25.10 CORONARY ARTERY DISEASE, ANGINA PRESENCE UNSPECIFIED, UNSPECIFIED VESSEL OR LESION TYPE, UNSPECIFIED WHETHER NATIVE OR TRANSPLANTED HEART: ICD-10-CM

## 2018-09-11 DIAGNOSIS — R53.81 DEBILITATED PATIENT: ICD-10-CM

## 2018-09-11 DIAGNOSIS — R00.1 BRADYCARDIA WITH LESS THAN 30 BEATS PER MINUTE: ICD-10-CM

## 2018-09-11 DIAGNOSIS — R10.2 ABDOMINAL PAIN, SUPRAPUBIC: ICD-10-CM

## 2018-09-11 DIAGNOSIS — R79.89 ELEVATED TROPONIN: ICD-10-CM

## 2018-09-11 DIAGNOSIS — Q27.30 AVM (ARTERIOVENOUS MALFORMATION): ICD-10-CM

## 2018-09-11 LAB
BILIRUB SERPL-MCNC: NEGATIVE MG/DL
BLOOD URINE, POC: NEGATIVE
COLOR, POC UA: ABNORMAL
GLUCOSE UR QL STRIP: NEGATIVE
KETONES UR QL STRIP: NEGATIVE
LEUKOCYTE ESTERASE URINE, POC: ABNORMAL
NITRITE, POC UA: NEGATIVE
PH, POC UA: 5
PROTEIN, POC: ABNORMAL
SPECIFIC GRAVITY, POC UA: 1.01
UROBILINOGEN, POC UA: NEGATIVE

## 2018-09-11 PROCEDURE — 81002 URINALYSIS NONAUTO W/O SCOPE: CPT | Mod: PBBFAC,PN | Performed by: FAMILY MEDICINE

## 2018-09-11 PROCEDURE — 99214 OFFICE O/P EST MOD 30 MIN: CPT | Mod: PBBFAC,PN | Performed by: FAMILY MEDICINE

## 2018-09-11 PROCEDURE — 1101F PT FALLS ASSESS-DOCD LE1/YR: CPT | Mod: ,,, | Performed by: FAMILY MEDICINE

## 2018-09-11 PROCEDURE — 99213 OFFICE O/P EST LOW 20 MIN: CPT | Mod: S$PBB,,, | Performed by: FAMILY MEDICINE

## 2018-09-11 PROCEDURE — 99999 PR PBB SHADOW E&M-EST. PATIENT-LVL IV: CPT | Mod: PBBFAC,,, | Performed by: FAMILY MEDICINE

## 2018-09-11 RX ORDER — TRAMADOL HYDROCHLORIDE 50 MG/1
50 TABLET ORAL EVERY 6 HOURS PRN
Qty: 30 TABLET | Refills: 0 | Status: SHIPPED | OUTPATIENT
Start: 2018-09-11 | End: 2018-12-21 | Stop reason: SDUPTHER

## 2018-09-11 RX ORDER — CIPROFLOXACIN 250 MG/1
250 TABLET, FILM COATED ORAL 2 TIMES DAILY
Qty: 20 TABLET | Refills: 0 | Status: SHIPPED | OUTPATIENT
Start: 2018-09-11 | End: 2018-09-21

## 2018-09-11 NOTE — PROGRESS NOTES
Subjective:       Patient ID: Parker Carrasco is a 89 y.o. male.    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  History of AV malformation of the cold history of elevated troponin in the past  Abdomen: No nausea, vomiting, diarrhea, constipation, ulcers, hepatitis, gallbladder disease, melena, hematochezia, hematemesis  : no UTI, renal disease, stones --history of urethral stenosis history of chronic renal insufficiency stage III  MS: no fractures, O/A, lupus, rheumatoid, gout  Neuro: No dizziness, LOC, seizures   No diabetes, patient with history of anemia-iron deficiency had stools that were positive for blood had colonoscopy and EGD where AV malformations of the jejunum were cauterized-Dr. Salcido his monitoring his anemia--patient was getting 1 unit of blood every month since procedure has only had to get blood wants hematocrit in August was 23.3 was transfused 2 units of blood 31.3, no anxiety, no depression     Objective:   Physical Exam:  General: Well nourished, well developed, no acute distress  Skin: No lesions  HEENT: Eyes PERRLA, EOM intact, enucleated left eye-block nose patent,  throat non-erythematous upper lower dentures ears decreased hearing bilaterally  NECK: Supple, no bruits, No JVD, no nodes  Lungs: Clear, no rales, rhonchi, wheezing  Heart: Regular rate and rhythm, no murmurs, gallops, or rubs  Abdomen: flat, some pain in the lower quadrants bilaterally and suprapubic area on palpation bowel sounds positive no organomegaly rebound or guarding  MS:  Severe deformity of the left knee-patient has refused surgery, pain in the lumbar spine L1-S1 especially on the left side anterior flexion 20° extension 10° lateral flexion rotation 10° straight leg lift pulling sensation lower back  Neuro: Alert, CN intact, oriented X 3  Extremities: No cyanosis, clubbing, or edema         Assessment:       1. Fall, initial encounter    2. Acute left-sided low back pain without sciatica    3. Abdominal pain, suprapubic    4. Mixed conductive and sensorineural hearing loss of both ears    5. Coronary artery disease, angina presence unspecified, unspecified vessel or lesion type, unspecified whether native or transplanted heart    6. Elevated troponin    7. Chronic systolic congestive heart failure    8. Bradycardia with less than 30 beats per minute    9. AVM (arteriovenous malformation)    10. Chronic renal impairment, stage 3 (moderate)    11. Iron deficiency anemia due to chronic blood loss    12. Debilitated patient        Plan:       Fall, initial encounter    Acute left-sided low back pain without sciatica  -     X-Ray Lumbar Spine Complete 5 View; Future; Expected date: 09/11/2018    Abdominal pain, suprapubic  -     POCT urine dipstick without microscope    Mixed conductive and sensorineural hearing loss of both ears    Coronary artery disease, angina presence unspecified, unspecified vessel or lesion type, unspecified whether native or transplanted heart    Elevated troponin    Chronic systolic congestive heart failure    Bradycardia with less than 30 beats per minute    AVM (arteriovenous  malformation)    Chronic renal impairment, stage 3 (moderate)    Iron deficiency anemia due to chronic blood loss    Debilitated patient    Other orders  -     traMADol (ULTRAM) 50 mg tablet; Take 1 tablet (50 mg total) by mouth every 6 (six) hours as needed for Pain.  Dispense: 30 tablet; Refill: 0      patient with history of urethral hesitancy/urethral stenosis/history of urethral dilatation/prostate cancer-patient sees Dr. Narvaez--will need to be re-evaluated for possible urethral stenosis abdominal pain appears to be secondary to  problems. Due to his age they do not want to do any surgery for prostate cancer--if gets obstruction may need to reconsider  Back pain secondary to fall Needs x-ray lumbar spine-Medrol Dosepak Ultram--Moist heat--theragesic, range of motion exercise.  Severe arthritis of the left knee--if pain stays me give patient omeprazole with Aleve 250 b.i.d. but needs to see how all for his chronic renal insufficiency is doing to see if able to tolerate NSAIDs  Patient get x-ray of the lumbar spine knees wants patient also to have CBCs CMP serum iron and total iron binding capacity and serum ferritin has already in computer at the hospital with day that the x-ray of the lumbar spine is done

## 2018-09-12 NOTE — TELEPHONE ENCOUNTER
----- Message from Tenisha Lombardo sent at 9/12/2018 10:58 AM CDT -----  Contact: Magui with [facility] phone [phone]  Type:  RX Refill Request    Who Called:  Magui with [facility]  Refill or New Rx:  Refill  RX Name and Strength:  lovastatin (MEVACOR) 20 MG tablet  How is the patient currently taking it? (ex. 1XDay):  Take 1 tablet (20 mg total) by mouth every evening  Is this a 30 day or 90 day RX:  90  Preferred Pharmacy with phone number:    [facility] - [place], [place] - [address]  [address] [address]  Phone: [phone] Fax: [phone]  Local or Mail Order:  Local  Ordering Provider:  Dr Kian Pulliam Call Back Number:  [phone]  Additional Information:  Please advise. Thanks.

## 2018-09-13 RX ORDER — LOVASTATIN 20 MG/1
TABLET ORAL
Qty: 90 TABLET | Refills: 3 | OUTPATIENT
Start: 2018-09-13

## 2018-09-19 ENCOUNTER — TELEPHONE (OUTPATIENT)
Dept: NEUROLOGY | Facility: HOSPITAL | Age: 83
End: 2018-09-19

## 2018-09-25 RX ORDER — FINASTERIDE 5 MG/1
TABLET, FILM COATED ORAL
Qty: 30 TABLET | Refills: 11 | Status: SHIPPED | OUTPATIENT
Start: 2018-09-25

## 2018-10-10 RX ORDER — AMLODIPINE BESYLATE 2.5 MG/1
TABLET ORAL
Qty: 30 TABLET | Refills: 3 | Status: SHIPPED | OUTPATIENT
Start: 2018-10-10

## 2018-10-10 RX ORDER — GABAPENTIN 300 MG/1
CAPSULE ORAL
Qty: 60 CAPSULE | Refills: 0 | Status: SHIPPED | OUTPATIENT
Start: 2018-10-10 | End: 2018-11-01 | Stop reason: SDUPTHER

## 2018-10-12 ENCOUNTER — INFUSION (OUTPATIENT)
Dept: INFUSION THERAPY | Facility: HOSPITAL | Age: 83
End: 2018-10-12
Attending: INTERNAL MEDICINE
Payer: MEDICARE

## 2018-10-12 VITALS — WEIGHT: 143 LBS | BODY MASS INDEX: 24.41 KG/M2 | HEIGHT: 64 IN

## 2018-10-12 DIAGNOSIS — D50.0 IRON DEFICIENCY ANEMIA DUE TO CHRONIC BLOOD LOSS: Primary | ICD-10-CM

## 2018-10-12 DIAGNOSIS — D64.9 ANEMIA, UNSPECIFIED TYPE: ICD-10-CM

## 2018-10-12 DIAGNOSIS — K92.2 GASTROINTESTINAL HEMORRHAGE, UNSPECIFIED GASTROINTESTINAL HEMORRHAGE TYPE: ICD-10-CM

## 2018-10-12 DIAGNOSIS — R19.5 POSITIVE OCCULT STOOL BLOOD TEST: ICD-10-CM

## 2018-10-12 PROCEDURE — 96372 THER/PROPH/DIAG INJ SC/IM: CPT

## 2018-10-12 PROCEDURE — 63600175 PHARM REV CODE 636 W HCPCS: Mod: JG | Performed by: INTERNAL MEDICINE

## 2018-10-12 RX ORDER — SODIUM CHLORIDE 0.9 % (FLUSH) 0.9 %
10 SYRINGE (ML) INJECTION
Status: CANCELLED | OUTPATIENT
Start: 2018-10-12

## 2018-10-12 RX ORDER — SODIUM CHLORIDE 9 MG/ML
INJECTION, SOLUTION INTRAVENOUS CONTINUOUS
Status: CANCELLED | OUTPATIENT
Start: 2018-10-12

## 2018-10-12 RX ORDER — HEPARIN 100 UNIT/ML
5 SYRINGE INTRAVENOUS
Status: CANCELLED | OUTPATIENT
Start: 2018-10-12

## 2018-10-12 RX ADMIN — OCTREOTIDE ACETATE 20 MG: KIT at 10:10

## 2018-10-12 NOTE — NURSING
Pt tolerated araseli injection well. Pt had no complaints. Pt is to follow up with Dr. Bill refgarding future treatments.

## 2018-10-31 PROBLEM — I50.9 ACUTE DECOMPENSATED HEART FAILURE: Status: ACTIVE | Noted: 2018-10-31

## 2018-10-31 PROBLEM — N18.4 CKD (CHRONIC KIDNEY DISEASE), STAGE IV: Status: ACTIVE | Noted: 2018-10-31

## 2018-10-31 PROBLEM — I35.0 AORTIC STENOSIS, SEVERE: Status: ACTIVE | Noted: 2018-10-31

## 2018-11-01 RX ORDER — GABAPENTIN 300 MG/1
CAPSULE ORAL
Qty: 60 CAPSULE | Refills: 0 | Status: SHIPPED | OUTPATIENT
Start: 2018-11-01 | End: 2018-12-20 | Stop reason: SDUPTHER

## 2018-11-14 RX ORDER — TAMSULOSIN HYDROCHLORIDE 0.4 MG/1
CAPSULE ORAL
Qty: 30 CAPSULE | Refills: 5 | Status: SHIPPED | OUTPATIENT
Start: 2018-11-14

## 2018-11-14 RX ORDER — PANTOPRAZOLE SODIUM 40 MG/1
TABLET, DELAYED RELEASE ORAL
Qty: 90 TABLET | Refills: 3 | Status: SHIPPED | OUTPATIENT
Start: 2018-11-14

## 2018-12-01 RX ORDER — TRAMADOL HYDROCHLORIDE 50 MG/1
TABLET ORAL
Qty: 30 TABLET | OUTPATIENT
Start: 2018-12-01

## 2018-12-06 RX ORDER — ISOSORBIDE DINITRATE 30 MG/1
TABLET ORAL
Qty: 60 TABLET | Refills: 5 | Status: SHIPPED | OUTPATIENT
Start: 2018-12-06

## 2018-12-21 ENCOUNTER — OFFICE VISIT (OUTPATIENT)
Dept: PRIMARY CARE CLINIC | Facility: CLINIC | Age: 83
End: 2018-12-21
Payer: MEDICARE

## 2018-12-21 VITALS
HEART RATE: 73 BPM | OXYGEN SATURATION: 90 % | BODY MASS INDEX: 22.85 KG/M2 | SYSTOLIC BLOOD PRESSURE: 113 MMHG | HEIGHT: 64 IN | WEIGHT: 133.81 LBS | DIASTOLIC BLOOD PRESSURE: 63 MMHG | RESPIRATION RATE: 18 BRPM | TEMPERATURE: 99 F

## 2018-12-21 DIAGNOSIS — Z23 NEED FOR PROPHYLACTIC VACCINATION AND INOCULATION AGAINST INFLUENZA: ICD-10-CM

## 2018-12-21 DIAGNOSIS — Z23 NEED FOR IMMUNIZATION AGAINST INFLUENZA: ICD-10-CM

## 2018-12-21 DIAGNOSIS — K92.2 GASTROINTESTINAL HEMORRHAGE, UNSPECIFIED GASTROINTESTINAL HEMORRHAGE TYPE: ICD-10-CM

## 2018-12-21 DIAGNOSIS — I50.22 CHRONIC SYSTOLIC CONGESTIVE HEART FAILURE: Primary | ICD-10-CM

## 2018-12-21 DIAGNOSIS — M51.16 LUMBAR DISC DISEASE WITH RADICULOPATHY: ICD-10-CM

## 2018-12-21 DIAGNOSIS — Z53.20 MENTAL HEALTH ASSESSMENT DECLINED: ICD-10-CM

## 2018-12-21 DIAGNOSIS — R53.83 FATIGUE, UNSPECIFIED TYPE: ICD-10-CM

## 2018-12-21 DIAGNOSIS — D64.9 ANEMIA, UNSPECIFIED TYPE: ICD-10-CM

## 2018-12-21 PROCEDURE — 99999 PR PBB SHADOW E&M-EST. PATIENT-LVL IV: CPT | Mod: PBBFAC,,, | Performed by: INTERNAL MEDICINE

## 2018-12-21 PROCEDURE — 90662 IIV NO PRSV INCREASED AG IM: CPT | Mod: S$GLB,,, | Performed by: INTERNAL MEDICINE

## 2018-12-21 PROCEDURE — 99214 OFFICE O/P EST MOD 30 MIN: CPT | Mod: 25,S$GLB,, | Performed by: INTERNAL MEDICINE

## 2018-12-21 PROCEDURE — 96372 THER/PROPH/DIAG INJ SC/IM: CPT | Mod: S$GLB,,, | Performed by: INTERNAL MEDICINE

## 2018-12-21 PROCEDURE — G0008 ADMIN INFLUENZA VIRUS VAC: HCPCS | Mod: 59,S$GLB,, | Performed by: INTERNAL MEDICINE

## 2018-12-21 PROCEDURE — 1101F PT FALLS ASSESS-DOCD LE1/YR: CPT | Mod: S$GLB,,, | Performed by: INTERNAL MEDICINE

## 2018-12-21 RX ORDER — FUROSEMIDE 40 MG/1
40 TABLET ORAL DAILY
Qty: 30 TABLET | Refills: 5 | Status: ON HOLD | OUTPATIENT
Start: 2018-12-21 | End: 2019-01-07 | Stop reason: HOSPADM

## 2018-12-21 RX ORDER — GABAPENTIN 300 MG/1
CAPSULE ORAL
Qty: 60 CAPSULE | Refills: 0 | Status: SHIPPED | OUTPATIENT
Start: 2018-12-21

## 2018-12-21 RX ORDER — CYANOCOBALAMIN 1000 UG/ML
1000 INJECTION, SOLUTION INTRAMUSCULAR; SUBCUTANEOUS
Status: COMPLETED | OUTPATIENT
Start: 2018-12-21 | End: 2018-12-21

## 2018-12-21 RX ORDER — BETAMETHASONE SODIUM PHOSPHATE AND BETAMETHASONE ACETATE 3; 3 MG/ML; MG/ML
12 INJECTION, SUSPENSION INTRA-ARTICULAR; INTRALESIONAL; INTRAMUSCULAR; SOFT TISSUE
Status: COMPLETED | OUTPATIENT
Start: 2018-12-21 | End: 2018-12-21

## 2018-12-21 RX ORDER — TRAMADOL HYDROCHLORIDE 50 MG/1
50 TABLET ORAL EVERY 6 HOURS PRN
Qty: 45 TABLET | Refills: 0 | Status: SHIPPED | OUTPATIENT
Start: 2018-12-21

## 2018-12-21 RX ADMIN — CYANOCOBALAMIN 1000 MCG: 1000 INJECTION, SOLUTION INTRAMUSCULAR; SUBCUTANEOUS at 10:12

## 2018-12-21 RX ADMIN — BETAMETHASONE SODIUM PHOSPHATE AND BETAMETHASONE ACETATE 12 MG: 3; 3 INJECTION, SUSPENSION INTRA-ARTICULAR; INTRALESIONAL; INTRAMUSCULAR; SOFT TISSUE at 10:12

## 2018-12-21 NOTE — PROGRESS NOTES
Patient ID verified by name and  per granddaughter, NKDA. Influenza High dose vaccine administered IM in left deltoid, Celestone 12 mg and B12 1000 mcg IM injection given in left ventrogluteal using aseptic technique. Aspirated with no blood noted. Patient tolerated well. Given per physicians order. No adverse reactions noted.

## 2018-12-23 NOTE — PROGRESS NOTES
Subjective:       Patient ID: Parker Carrasco is a 89 y.o. male.    Chief Complaint: Results and Medication Refill    HPI patient is here for follow-up has been in hospital multiple times for CHF exacerbation and anemia probably from GI loss but chronic not able to identify the source to cauterize patient lives by himself multiple medical problem had home health before patient also needs refill medication for his chronic pain in the lower back and the knees from osteoarthritis patient cannot rest at night due to pain patient also have chronic renal insufficiency his condition physically gradually declining due to aging and multiple medical problems may need long-term care in the future  Review of Systems    Objective:      Physical Exam   Constitutional: He is oriented to person, place, and time. He appears well-developed and well-nourished. No distress.   Weight loss   HENT:   Head: Normocephalic and atraumatic.   Right Ear: External ear normal.   Left Ear: External ear normal.   Nose: Nose normal.   Mouth/Throat: Oropharynx is clear and moist. No oropharyngeal exudate.   Severe hearing loss   Eyes: Conjunctivae and EOM are normal. Right eye exhibits no discharge. Left eye exhibits no discharge.   A blood of blindness from the left eye with prosthesis   Neck: Normal range of motion. Neck supple. No thyromegaly present.   Cardiovascular: Normal rate, regular rhythm, normal heart sounds and intact distal pulses. Exam reveals no gallop and no friction rub.   No murmur heard.  Pulmonary/Chest: Effort normal and breath sounds normal. No respiratory distress. He has no wheezes. He has no rales. He exhibits no tenderness.   Abdominal: Soft. Bowel sounds are normal. He exhibits no distension. There is no tenderness. There is no rebound and no guarding.   Musculoskeletal: Normal range of motion. He exhibits tenderness (Tenderness of the both knee and lower back with palpation). He exhibits no edema or deformity.    Lymphadenopathy:     He has no cervical adenopathy.   Neurological: He is alert and oriented to person, place, and time.   Skin: Skin is warm and dry. Capillary refill takes less than 2 seconds. No rash noted. No erythema.   Psychiatric: He has a normal mood and affect. Judgment and thought content normal.   Nursing note and vitals reviewed.      Assessment:       1. Chronic systolic congestive heart failure    2. Need for immunization against influenza    3. Lumbar disc disease with radiculopathy    4. Anemia, unspecified type    5. Fatigue, unspecified type    6. Need for prophylactic vaccination and inoculation against influenza    7. Mental health assessment declined    8. Gastrointestinal hemorrhage, unspecified gastrointestinal hemorrhage type        Plan:       Chronic systolic congestive heart failure  -     furosemide (LASIX) 40 MG tablet; Take 1 tablet (40 mg total) by mouth once daily.  Dispense: 30 tablet; Refill: 5    Need for immunization against influenza    Lumbar disc disease with radiculopathy  Comments:  Patient had chronic renal insufficiency and GI bleed has to avoid NSAID  Orders:  -     traMADol (ULTRAM) 50 mg tablet; Take 1 tablet (50 mg total) by mouth every 6 (six) hours as needed for Pain.  Dispense: 45 tablet; Refill: 0  -     betamethasone acetate-betamethasone sodium phosphate injection 12 mg    Anemia, unspecified type  -     cyanocobalamin injection 1,000 mcg    Fatigue, unspecified type  -     cyanocobalamin injection 1,000 mcg    Need for prophylactic vaccination and inoculation against influenza  -     Flu Vaccine - High Dose (PF) (65+)    Mental health assessment declined  Comments:  Will try to get home health to resume    Gastrointestinal hemorrhage, unspecified gastrointestinal hemorrhage type  Comments:  Avoid NSAIDs and aspirin

## 2018-12-24 RX ORDER — LOVASTATIN 20 MG/1
TABLET ORAL
Qty: 90 TABLET | Refills: 3 | Status: SHIPPED | OUTPATIENT
Start: 2018-12-24

## 2018-12-24 RX ORDER — CARBIDOPA AND LEVODOPA 10; 100 MG/1; MG/1
TABLET ORAL
Qty: 60 TABLET | Refills: 5 | Status: SHIPPED | OUTPATIENT
Start: 2018-12-24

## 2019-01-02 PROBLEM — R54 FRAILTY SYNDROME IN GERIATRIC PATIENT: Status: ACTIVE | Noted: 2019-01-02

## 2019-01-02 PROBLEM — R62.7 FAILURE TO THRIVE SYNDROME, ADULT: Status: ACTIVE | Noted: 2019-01-02

## 2019-01-02 PROBLEM — K59.1 FUNCTIONAL DIARRHEA: Status: ACTIVE | Noted: 2019-01-02

## 2019-01-03 PROBLEM — M17.11 OSTEOARTHRITIS OF RIGHT KNEE: Status: ACTIVE | Noted: 2019-01-03

## 2019-01-04 PROBLEM — I95.2 HYPOTENSION DUE TO DRUGS: Status: ACTIVE | Noted: 2019-01-04

## 2019-01-07 PROBLEM — K59.1 FUNCTIONAL DIARRHEA: Status: RESOLVED | Noted: 2019-01-02 | Resolved: 2019-01-07

## 2019-01-07 PROBLEM — I95.2 HYPOTENSION DUE TO DRUGS: Status: RESOLVED | Noted: 2019-01-04 | Resolved: 2019-01-07

## 2019-01-17 RX ORDER — PAROXETINE HYDROCHLORIDE 20 MG/1
TABLET, FILM COATED ORAL
Qty: 30 TABLET | Refills: 5 | Status: SHIPPED | OUTPATIENT
Start: 2019-01-17

## 2024-08-13 NOTE — OR NURSING
Patient intubated. Tolerated well. VS stable   Could recall 0/3 items after 5 minutes, 2 items with hints.  Serials 7s normal, could not recall past 3 presidents or mayors. Friends present for interview state he normally does not demonstrate such issues with recall indicating memory impairment may be a result of high opioid doses or severe pain.